# Patient Record
Sex: FEMALE | Race: WHITE | NOT HISPANIC OR LATINO | URBAN - METROPOLITAN AREA
[De-identification: names, ages, dates, MRNs, and addresses within clinical notes are randomized per-mention and may not be internally consistent; named-entity substitution may affect disease eponyms.]

---

## 2017-05-03 ENCOUNTER — OUTPATIENT (OUTPATIENT)
Dept: OUTPATIENT SERVICES | Facility: HOSPITAL | Age: 82
LOS: 1 days | Discharge: HOME | End: 2017-05-03

## 2017-06-28 DIAGNOSIS — R79.89 OTHER SPECIFIED ABNORMAL FINDINGS OF BLOOD CHEMISTRY: ICD-10-CM

## 2017-06-28 DIAGNOSIS — D50.9 IRON DEFICIENCY ANEMIA, UNSPECIFIED: ICD-10-CM

## 2017-07-10 ENCOUNTER — OUTPATIENT (OUTPATIENT)
Dept: OUTPATIENT SERVICES | Facility: HOSPITAL | Age: 82
LOS: 1 days | Discharge: HOME | End: 2017-07-10

## 2017-07-10 DIAGNOSIS — E53.8 DEFICIENCY OF OTHER SPECIFIED B GROUP VITAMINS: ICD-10-CM

## 2017-07-10 DIAGNOSIS — R79.89 OTHER SPECIFIED ABNORMAL FINDINGS OF BLOOD CHEMISTRY: ICD-10-CM

## 2017-07-10 DIAGNOSIS — E55.9 VITAMIN D DEFICIENCY, UNSPECIFIED: ICD-10-CM

## 2017-07-10 DIAGNOSIS — E78.00 PURE HYPERCHOLESTEROLEMIA, UNSPECIFIED: ICD-10-CM

## 2017-07-10 DIAGNOSIS — E03.9 HYPOTHYROIDISM, UNSPECIFIED: ICD-10-CM

## 2017-08-11 ENCOUNTER — OUTPATIENT (OUTPATIENT)
Dept: OUTPATIENT SERVICES | Facility: HOSPITAL | Age: 82
LOS: 1 days | Discharge: HOME | End: 2017-08-11

## 2017-08-11 DIAGNOSIS — N39.0 URINARY TRACT INFECTION, SITE NOT SPECIFIED: ICD-10-CM

## 2017-08-11 DIAGNOSIS — D50.9 IRON DEFICIENCY ANEMIA, UNSPECIFIED: ICD-10-CM

## 2017-08-11 DIAGNOSIS — R79.89 OTHER SPECIFIED ABNORMAL FINDINGS OF BLOOD CHEMISTRY: ICD-10-CM

## 2017-08-11 DIAGNOSIS — E53.8 DEFICIENCY OF OTHER SPECIFIED B GROUP VITAMINS: ICD-10-CM

## 2017-11-29 ENCOUNTER — OUTPATIENT (OUTPATIENT)
Dept: OUTPATIENT SERVICES | Facility: HOSPITAL | Age: 82
LOS: 1 days | Discharge: HOME | End: 2017-11-29

## 2017-11-29 DIAGNOSIS — R79.89 OTHER SPECIFIED ABNORMAL FINDINGS OF BLOOD CHEMISTRY: ICD-10-CM

## 2017-11-29 DIAGNOSIS — N39.0 URINARY TRACT INFECTION, SITE NOT SPECIFIED: ICD-10-CM

## 2018-04-20 ENCOUNTER — OUTPATIENT (OUTPATIENT)
Dept: OUTPATIENT SERVICES | Facility: HOSPITAL | Age: 83
LOS: 1 days | Discharge: HOME | End: 2018-04-20

## 2018-04-20 DIAGNOSIS — R79.89 OTHER SPECIFIED ABNORMAL FINDINGS OF BLOOD CHEMISTRY: ICD-10-CM

## 2018-04-20 DIAGNOSIS — R70.0 ELEVATED ERYTHROCYTE SEDIMENTATION RATE: ICD-10-CM

## 2018-06-08 ENCOUNTER — OUTPATIENT (OUTPATIENT)
Dept: OUTPATIENT SERVICES | Facility: HOSPITAL | Age: 83
LOS: 1 days | Discharge: HOME | End: 2018-06-08

## 2018-06-08 DIAGNOSIS — Z02.9 ENCOUNTER FOR ADMINISTRATIVE EXAMINATIONS, UNSPECIFIED: ICD-10-CM

## 2018-06-14 ENCOUNTER — OUTPATIENT (OUTPATIENT)
Dept: OUTPATIENT SERVICES | Facility: HOSPITAL | Age: 83
LOS: 1 days | Discharge: HOME | End: 2018-06-14

## 2018-06-14 DIAGNOSIS — D64.9 ANEMIA, UNSPECIFIED: ICD-10-CM

## 2018-06-14 DIAGNOSIS — R79.89 OTHER SPECIFIED ABNORMAL FINDINGS OF BLOOD CHEMISTRY: ICD-10-CM

## 2018-06-15 ENCOUNTER — OUTPATIENT (OUTPATIENT)
Dept: OUTPATIENT SERVICES | Facility: HOSPITAL | Age: 83
LOS: 1 days | Discharge: HOME | End: 2018-06-15

## 2018-06-15 DIAGNOSIS — R79.89 OTHER SPECIFIED ABNORMAL FINDINGS OF BLOOD CHEMISTRY: ICD-10-CM

## 2018-06-15 DIAGNOSIS — D64.9 ANEMIA, UNSPECIFIED: ICD-10-CM

## 2018-07-06 ENCOUNTER — OUTPATIENT (OUTPATIENT)
Dept: OUTPATIENT SERVICES | Facility: HOSPITAL | Age: 83
LOS: 1 days | Discharge: HOME | End: 2018-07-06

## 2018-07-06 DIAGNOSIS — Z02.9 ENCOUNTER FOR ADMINISTRATIVE EXAMINATIONS, UNSPECIFIED: ICD-10-CM

## 2018-08-13 ENCOUNTER — OUTPATIENT (OUTPATIENT)
Dept: OUTPATIENT SERVICES | Facility: HOSPITAL | Age: 83
LOS: 1 days | Discharge: HOME | End: 2018-08-13

## 2018-08-13 DIAGNOSIS — R79.89 OTHER SPECIFIED ABNORMAL FINDINGS OF BLOOD CHEMISTRY: ICD-10-CM

## 2018-08-13 DIAGNOSIS — D64.9 ANEMIA, UNSPECIFIED: ICD-10-CM

## 2019-02-02 ENCOUNTER — APPOINTMENT (OUTPATIENT)
Dept: GERIATRICS | Facility: HOME HEALTH | Age: 84
End: 2019-02-02

## 2019-02-04 PROBLEM — Z00.00 ENCOUNTER FOR PREVENTIVE HEALTH EXAMINATION: Status: ACTIVE | Noted: 2019-02-04

## 2019-02-10 NOTE — HISTORY OF PRESENT ILLNESS
[FreeTextEntry1] : pt  dtr called for pt having swelling of R knee nand R anlkle for last few days. no obvious h/o trauma. XR neg for any  pathology

## 2019-02-10 NOTE — PHYSICAL EXAM
[General Appearance - In No Acute Distress] : in no acute distress [Sclera] : the sclera and conjunctiva were normal [PERRL With Normal Accommodation] : pupils were equal in size, round, and reactive to light [Extraocular Movements] : extraocular movements were intact [Auscultation Breath Sounds / Voice Sounds] : lungs were clear to auscultation bilaterally [Heart Rate And Rhythm] : heart rate was normal and rhythm regular [Heart Sounds] : normal S1 and S2 [Heart Sounds Gallop] : no gallops [Murmurs] : no murmurs [Heart Sounds Pericardial Friction Rub] : no pericardial rub [Full Pulse] : the pedal pulses are present [Edema] : there was no peripheral edema [Bowel Sounds] : normal bowel sounds [Abdomen Soft] : soft [Abdomen Tenderness] : non-tender [Abdomen Mass (___ Cm)] : no abdominal mass palpated [Ataxic] : ataxic [FreeTextEntry1] : R ankle swelling, no erythema, no tenderness, LOM of LE >UE [Skin Color & Pigmentation] : normal skin color and pigmentation [Skin Turgor] : normal skin turgor [] : no rash

## 2019-02-10 NOTE — REVIEW OF SYSTEMS
[Arthralgias] : arthralgias [Joint Swelling] : joint swelling [Negative] : Integumentary [FreeTextEntry2] : s/p dementia [FreeTextEntry9] : R ankle and R Knee [de-identified] : pt stable on dementia

## 2019-02-19 ENCOUNTER — INPATIENT (INPATIENT)
Facility: HOSPITAL | Age: 84
LOS: 6 days | Discharge: HOME HEALTH | End: 2019-02-26
Attending: INTERNAL MEDICINE | Admitting: INTERNAL MEDICINE
Payer: MEDICARE

## 2019-02-19 VITALS
SYSTOLIC BLOOD PRESSURE: 128 MMHG | TEMPERATURE: 97 F | DIASTOLIC BLOOD PRESSURE: 66 MMHG | OXYGEN SATURATION: 98 % | HEART RATE: 60 BPM | RESPIRATION RATE: 20 BRPM

## 2019-02-19 DIAGNOSIS — Z90.49 ACQUIRED ABSENCE OF OTHER SPECIFIED PARTS OF DIGESTIVE TRACT: Chronic | ICD-10-CM

## 2019-02-19 DIAGNOSIS — Z96.649 PRESENCE OF UNSPECIFIED ARTIFICIAL HIP JOINT: Chronic | ICD-10-CM

## 2019-02-19 LAB
ALBUMIN SERPL ELPH-MCNC: 3.7 G/DL — SIGNIFICANT CHANGE UP (ref 3.5–5.2)
ALP SERPL-CCNC: 63 U/L — SIGNIFICANT CHANGE UP (ref 30–115)
ALT FLD-CCNC: 17 U/L — SIGNIFICANT CHANGE UP (ref 0–41)
ANION GAP SERPL CALC-SCNC: 11 MMOL/L — SIGNIFICANT CHANGE UP (ref 7–14)
ANION GAP SERPL CALC-SCNC: 17 MMOL/L — HIGH (ref 7–14)
AST SERPL-CCNC: 35 U/L — SIGNIFICANT CHANGE UP (ref 0–41)
BASE EXCESS BLDV CALC-SCNC: 3 MMOL/L — HIGH (ref -2–2)
BASOPHILS # BLD AUTO: 0.04 K/UL — SIGNIFICANT CHANGE UP (ref 0–0.2)
BASOPHILS NFR BLD AUTO: 0.4 % — SIGNIFICANT CHANGE UP (ref 0–1)
BILIRUB SERPL-MCNC: 0.5 MG/DL — SIGNIFICANT CHANGE UP (ref 0.2–1.2)
BUN SERPL-MCNC: 40 MG/DL — HIGH (ref 10–20)
BUN SERPL-MCNC: 41 MG/DL — HIGH (ref 10–20)
CA-I SERPL-SCNC: 1.29 MMOL/L — SIGNIFICANT CHANGE UP (ref 1.12–1.3)
CALCIUM SERPL-MCNC: 9.3 MG/DL — SIGNIFICANT CHANGE UP (ref 8.5–10.1)
CALCIUM SERPL-MCNC: 9.6 MG/DL — SIGNIFICANT CHANGE UP (ref 8.5–10.1)
CHLORIDE SERPL-SCNC: 107 MMOL/L — SIGNIFICANT CHANGE UP (ref 98–110)
CHLORIDE SERPL-SCNC: 108 MMOL/L — SIGNIFICANT CHANGE UP (ref 98–110)
CO2 SERPL-SCNC: 26 MMOL/L — SIGNIFICANT CHANGE UP (ref 17–32)
CO2 SERPL-SCNC: 27 MMOL/L — SIGNIFICANT CHANGE UP (ref 17–32)
CREAT SERPL-MCNC: 1 MG/DL — SIGNIFICANT CHANGE UP (ref 0.7–1.5)
CREAT SERPL-MCNC: 1.1 MG/DL — SIGNIFICANT CHANGE UP (ref 0.7–1.5)
EOSINOPHIL # BLD AUTO: 0.01 K/UL — SIGNIFICANT CHANGE UP (ref 0–0.7)
EOSINOPHIL NFR BLD AUTO: 0.1 % — SIGNIFICANT CHANGE UP (ref 0–8)
GAS PNL BLDV: 152 MMOL/L — HIGH (ref 136–145)
GAS PNL BLDV: SIGNIFICANT CHANGE UP
GLUCOSE SERPL-MCNC: 105 MG/DL — HIGH (ref 70–99)
GLUCOSE SERPL-MCNC: 177 MG/DL — HIGH (ref 70–99)
HCO3 BLDV-SCNC: 31 MMOL/L — HIGH (ref 22–29)
HCT VFR BLD CALC: 37 % — SIGNIFICANT CHANGE UP (ref 37–47)
HCT VFR BLDA CALC: 34.7 % — SIGNIFICANT CHANGE UP (ref 34–44)
HGB BLD CALC-MCNC: 11.3 G/DL — LOW (ref 14–18)
HGB BLD-MCNC: 11.1 G/DL — LOW (ref 12–16)
IMM GRANULOCYTES NFR BLD AUTO: 0.4 % — HIGH (ref 0.1–0.3)
LACTATE BLDV-MCNC: 4.1 MMOL/L — HIGH (ref 0.5–1.6)
LYMPHOCYTES # BLD AUTO: 0.79 K/UL — LOW (ref 1.2–3.4)
LYMPHOCYTES # BLD AUTO: 8.1 % — LOW (ref 20.5–51.1)
MAGNESIUM SERPL-MCNC: 2.4 MG/DL — SIGNIFICANT CHANGE UP (ref 1.8–2.4)
MCHC RBC-ENTMCNC: 25.4 PG — LOW (ref 27–31)
MCHC RBC-ENTMCNC: 30 G/DL — LOW (ref 32–37)
MCV RBC AUTO: 84.7 FL — SIGNIFICANT CHANGE UP (ref 81–99)
MONOCYTES # BLD AUTO: 0.61 K/UL — HIGH (ref 0.1–0.6)
MONOCYTES NFR BLD AUTO: 6.2 % — SIGNIFICANT CHANGE UP (ref 1.7–9.3)
NEUTROPHILS # BLD AUTO: 8.31 K/UL — HIGH (ref 1.4–6.5)
NEUTROPHILS NFR BLD AUTO: 84.8 % — HIGH (ref 42.2–75.2)
NRBC # BLD: 0 /100 WBCS — SIGNIFICANT CHANGE UP (ref 0–0)
NT-PROBNP SERPL-SCNC: 9678 PG/ML — HIGH (ref 0–300)
PCO2 BLDV: 62 MMHG — HIGH (ref 41–51)
PH BLDV: 7.31 — SIGNIFICANT CHANGE UP (ref 7.26–7.43)
PLATELET # BLD AUTO: 196 K/UL — SIGNIFICANT CHANGE UP (ref 130–400)
PO2 BLDV: 22 MMHG — SIGNIFICANT CHANGE UP (ref 20–40)
POTASSIUM BLDV-SCNC: 4.8 MMOL/L — SIGNIFICANT CHANGE UP (ref 3.3–5.6)
POTASSIUM SERPL-MCNC: 5 MMOL/L — SIGNIFICANT CHANGE UP (ref 3.5–5)
POTASSIUM SERPL-MCNC: 5.2 MMOL/L — HIGH (ref 3.5–5)
POTASSIUM SERPL-SCNC: 5 MMOL/L — SIGNIFICANT CHANGE UP (ref 3.5–5)
POTASSIUM SERPL-SCNC: 5.2 MMOL/L — HIGH (ref 3.5–5)
PROT SERPL-MCNC: 6.9 G/DL — SIGNIFICANT CHANGE UP (ref 6–8)
RBC # BLD: 4.37 M/UL — SIGNIFICANT CHANGE UP (ref 4.2–5.4)
RBC # FLD: 21.3 % — HIGH (ref 11.5–14.5)
SAO2 % BLDV: 24 % — SIGNIFICANT CHANGE UP
SODIUM SERPL-SCNC: 145 MMOL/L — SIGNIFICANT CHANGE UP (ref 135–146)
SODIUM SERPL-SCNC: 151 MMOL/L — HIGH (ref 135–146)
TROPONIN T SERPL-MCNC: 0.07 NG/ML — CRITICAL HIGH
TROPONIN T SERPL-MCNC: 0.08 NG/ML — CRITICAL HIGH
WBC # BLD: 9.8 K/UL — SIGNIFICANT CHANGE UP (ref 4.8–10.8)
WBC # FLD AUTO: 9.8 K/UL — SIGNIFICANT CHANGE UP (ref 4.8–10.8)

## 2019-02-19 RX ORDER — MEMANTINE HYDROCHLORIDE 10 MG/1
10 TABLET ORAL
Qty: 0 | Refills: 0 | Status: DISCONTINUED | OUTPATIENT
Start: 2019-02-19 | End: 2019-02-26

## 2019-02-19 RX ORDER — CHLORHEXIDINE GLUCONATE 213 G/1000ML
1 SOLUTION TOPICAL
Qty: 0 | Refills: 0 | Status: DISCONTINUED | OUTPATIENT
Start: 2019-02-19 | End: 2019-02-26

## 2019-02-19 RX ORDER — SODIUM CHLORIDE 9 MG/ML
1000 INJECTION, SOLUTION INTRAVENOUS ONCE
Qty: 0 | Refills: 0 | Status: DISCONTINUED | OUTPATIENT
Start: 2019-02-19 | End: 2019-02-19

## 2019-02-19 RX ORDER — RIVASTIGMINE 4.6 MG/24H
1 PATCH, EXTENDED RELEASE TRANSDERMAL
Qty: 0 | Refills: 0 | COMMUNITY

## 2019-02-19 RX ORDER — MEMANTINE HYDROCHLORIDE 10 MG/1
1 TABLET ORAL
Qty: 0 | Refills: 0 | COMMUNITY

## 2019-02-19 RX ORDER — ASPIRIN/CALCIUM CARB/MAGNESIUM 324 MG
81 TABLET ORAL DAILY
Qty: 0 | Refills: 0 | Status: DISCONTINUED | OUTPATIENT
Start: 2019-02-19 | End: 2019-02-23

## 2019-02-19 RX ORDER — SODIUM CHLORIDE 9 MG/ML
2000 INJECTION, SOLUTION INTRAVENOUS ONCE
Qty: 0 | Refills: 0 | Status: COMPLETED | OUTPATIENT
Start: 2019-02-19 | End: 2019-02-19

## 2019-02-19 RX ORDER — ENOXAPARIN SODIUM 100 MG/ML
40 INJECTION SUBCUTANEOUS DAILY
Qty: 0 | Refills: 0 | Status: DISCONTINUED | OUTPATIENT
Start: 2019-02-19 | End: 2019-02-21

## 2019-02-19 RX ADMIN — SODIUM CHLORIDE 1000 MILLILITER(S): 9 INJECTION, SOLUTION INTRAVENOUS at 13:30

## 2019-02-19 NOTE — ED ADULT NURSE NOTE - NSIMPLEMENTINTERV_GEN_ALL_ED
Implemented All Fall Risk Interventions:  [place] to call system. Call bell, personal items and telephone within reach. Instruct patient to call for assistance. Room bathroom lighting operational. Non-slip footwear when patient is off stretcher. Physically safe environment: no spills, clutter or unnecessary equipment. Stretcher in lowest position, wheels locked, appropriate side rails in place. Provide visual cue, wrist band, yellow gown, etc. Monitor gait and stability. Monitor for mental status changes and reorient to person, place, and time. Review medications for side effects contributing to fall risk. Reinforce activity limits and safety measures with patient and family.

## 2019-02-19 NOTE — ED PROVIDER NOTE - CLINICAL SUMMARY MEDICAL DECISION MAKING FREE TEXT BOX
Patient presented s/p witnessed episode of unresponsiveness. No seizure activity, no incontinence, no post-ictal period to suggest seizure. No trauma since patient was guided down to seated position. Asymptomatic on arrival to ED, no neuro deficits to suggest CVA, although TIA possibility. Labs remarkable for elevated lactate, but no WBC count, patient appears dry on exam and family states patient has not been eating well over the past few days. This coupled with mild hypernatremia makes this more likely. Will repeat VBG. CT head unremarkable, although TIA remains a possibility. Patient with non-ischemic changes on EKG with elevated troponin and pro-bnp, which is more likely etiology. Otherwise HD stable, no cardiac events during ED monitoring. Will admit for further work up and monitoring. Patient and family agreeable with plan.

## 2019-02-19 NOTE — H&P ADULT - ATTENDING COMMENTS
89F with PMH of dementia and HTN was brought to ED for syncopal episode. As per daughter patient was feeling weak for the last few days, yesterday patient was at home walking with her walker when she felt weak, she helped her to sit down on the chair and suddenly patient became unresponsive and looks like her breath stopped, she called the ambulance immediately and advised to put he patient on the bed, then patient became awake. When EMS came she was hypotensive to SBP 80s, unknown HR, however her BP improved with no intervention and she returned to baseline mental status.  In ED, afebrile, VSS, Na 151, lactate 4, trop 0.08, BNP 9K. EKG showed 1st degree AV block (OH of 250), CT H showed non specific periventricular hypodensities,     PHYSICAL EXAM:  GENERAL: NAD, well-developed  HEAD:  Atraumatic, Normocephalic  EYES: EOMI, PERRLA, conjunctiva and sclera clear  NECK: Supple, No JVD  CHEST/LUNG: Clear to auscultation bilaterally; No wheeze  HEART: Regular rate and rhythm; S1 S2  ABDOMEN: Soft, Nontender, Nondistended; Bowel sounds present  EXTREMITIES:  2+ Peripheral Pulses, No clubbing, cyanosis, or edema  PSYCH: Alert, demented.   NEUROLOGY: non-focal    A/P:   Syncopal Episode:   Likely from hypotension, can't rule out arrhythmia or CVA.   Check orthostatic.   Continue Telemetry monitor.   Head CT   Pending EEG.     Hypotension:   Improved.   possible from dehydration.     Hypernatremia: Mild.   likely from poor oral intake.   Start on D5 50cc per hour   BMP monitor every 12 hrs.    Elevated troponin and Pro-BNP:   Check Echo, telemetry monitor.     Dementia:   stable.

## 2019-02-19 NOTE — H&P ADULT - HISTORY OF PRESENT ILLNESS
89F with PMHx of dementia and HTN presents for "freezing"/brief change in mental status. Patient is a poor historian, history per daughter. Since yesterday patient's gait has been altered, appearing "knock kneed" and slower, and appeared slightly weaker than normal, however patient had no other specific complaints or changes. Today patient was walking with walker, when she suddenly froze with eyes open and staring straight ahead. She was grabbed by her daughter and aide and placed in a chair, and was in this state for approximately 5-10 minutes. Additionally, she appeared to only be breathing out of the side of her mouth. When EMS came she was hypotensive to SBP 80s, unknown HR, however her BP improved with no intervention and she returned to baseline mental status.  In ED, afebrile, VSS, Na 151, lactate 4, trop 0.08, BNP ~78013, CXR no effusions or congestion on my read, given 2L LR. EKG showed 1st degree AV block (LA of 250), CT H showed non specific nika-ventrical hypodensities, DDx include gliosis, demyelination or ischemic changes. UA not yet sent.

## 2019-02-19 NOTE — ED PROVIDER NOTE - PHYSICAL EXAMINATION
Vital Signs: I have reviewed the initial vital signs.  Constitutional: NAD, well-nourished, appears stated age, no acute distress.  HEENT: Airway patent, moist MM, no erythema/swelling/deformity of oral structures. EOMI, PERRLA.  CV: regular rate, regular rhythm, well-perfused extremities, 2+ b/l DP and radial pulses equal.  Lungs: BCTA, no increased WOB.  ABD: NTND, no guarding or rebound, no pulsatile mass, no hernias.   MSK: Neck supple, nontender, nl ROM, no stepoff. Chest nontender. Back nontender in TLS spine or to b/l bony structures or flanks. Ext nontender, nl rom, no deformity.   INTEG: Skin warm, dry, no rash.  NEURO: A&Ox3, normal strength, nl sensation throughout, normal speech.   PSYCH: Calm, cooperative, normal affect and interaction.

## 2019-02-19 NOTE — ED PROVIDER NOTE - CARE PLAN
Principal Discharge DX:	Syncope, unspecified syncope type  Secondary Diagnosis:	Elevated troponin  Secondary Diagnosis:	Elevated lactic acid level  Secondary Diagnosis:	Abnormal EKG

## 2019-02-19 NOTE — ED ADULT TRIAGE NOTE - CHIEF COMPLAINT QUOTE
c/o weakness and syncopal episode while getting a bath by the home health aide. Hx dementia. Normal baseline.

## 2019-02-19 NOTE — H&P ADULT - NSHPLABSRESULTS_GEN_ALL_CORE
< from: CT Head No Cont (02.19.19 @ 15:49) >      1.  No acute intracranial hemorrhage.    2.  Chronic right temporal lobe infarct.    3.  Periventricular white matter hypodensities, nonspecific, differential   diagnostic possibilities include ischemic change, gliosis or   demyelination.

## 2019-02-19 NOTE — ED PROVIDER NOTE - PROGRESS NOTE DETAILS
Patient seen and evaluated by me. Labs/imaging ordered. Patient neuro intact, no evidence of trauma, and entire episode was witnessed without trauma. Will do syncopal work up + CT head for possible seizure, although less likely.

## 2019-02-19 NOTE — H&P ADULT - NSHPPHYSICALEXAM_GEN_ALL_CORE
General: NAD  HEENT: NCAT, EOMI  Pulm: CTAB, no respiratory distress  CVS: Distant heart sounds, regular rate  GI: Soft, NT, ND  : No suprapubic pain  Extremities: No edema  Neuro: AAOx1, no FND

## 2019-02-19 NOTE — H&P ADULT - ASSESSMENT
89F with PMHx of dementia and HTN presents for "freezing"/brief change in mental status.    Change in mental status/LOC: Described as patient freezing and staring, no limb movements, incontinence, lactate 4, lasted 5-10 minutes, returned to baseline, hypotensive when EMS arrived however improved with no intervention (seizure (elevated lactate) vs syncope vs vasovagal vs conduction delay vs symptomatic hypernatremia vs other)  - 2d echo  - REEG  - Consider MRI brain (CTH showed periventricular hypodensities, non specific)  - Repeat EKG, not bradycardic but 1st AV block (possible pause?)  - IVF  - Monitor lactate    Hypernatremia:  - IVF  - Monitor BMP    Dementia:  - Continue with memantine  - Patient uses home exelon patches    Diet: DASH  DVT ppx: Lovenox  Dispo: From home, lives with daughter, has full time aide 89F with PMHx of dementia and HTN presents for "freezing"/brief change in mental status.    Change in mental status/LOC: Described as patient freezing and staring, no limb movements, incontinence, lactate 4, lasted 5-10 minutes, returned to baseline, hypotensive when EMS arrived however improved with no intervention (seizure (elevated lactate) vs syncope vs vasovagal vs conduction delay vs symptomatic hypernatremia vs other)  - 2d echo  - REEG  - Consider MRI brain (CTH showed periventricular hypodensities, non specific)  - Repeat EKG, not bradycardic but 1st AV block (possible pause?)  - IVF  - Monitor lactate    Hypernatremia: Received 2L in ED  - D5  - Monitor BMP    Elevated BNP: Does not appear overloaded; will give fluid for hypernatremia  - F/u CXR  - 2d echo    Dementia:  - Continue with memantine  - Patient uses home exelon patches    Diet: DASH  DVT ppx: Lovenox  Dispo: From home, lives with daughter, has full time aide 89F with PMHx of dementia and HTN presents for "freezing"/brief change in mental status.    Change in mental status/LOC: Described as patient freezing and staring, no limb movements, incontinence, lactate 4, lasted 5-10 minutes, returned to baseline, hypotensive when EMS arrived however improved with no intervention (seizure (elevated lactate) vs syncope vs vasovagal vs conduction delay vs symptomatic hypernatremia vs other)  - 2d echo  - REEG  - Consider MRI brain (CTH showed periventricular hypodensities, non specific)  - Repeat EKG, not bradycardic but 1st AV block (possible pause?)  - IVF  - Monitor lactate    Hypernatremia: Received 2L in ED  - D5  - Monitor BMP    Elevated BNP: Does not appear overloaded; will give fluid for hypernatremia  - F/u CXR  - 2d echo    troponinemia: First trop 0.08, no CP  - Serial CE, EKG    Dementia:  - Continue with memantine  - Patient uses home exelon patches    Diet: DASH  DVT ppx: Lovenox  Dispo: From home, lives with daughter, has full time aide 89F with PMHx of dementia and HTN presents for "freezing"/brief change in mental status.    Change in mental status/LOC: Described as patient freezing and staring, no limb movements, incontinence, lactate 4, lasted 5-10 minutes, returned to baseline, hypotensive when EMS arrived however improved with no intervention (seizure (elevated lactate) vs syncope vs vasovagal vs conduction delay vs symptomatic hypernatremia vs other)  - 2d echo  - REEG  - Consider MRI brain (CTH showed periventricular hypodensities, non specific)  - Repeat EKG, not bradycardic but 1st AV block (possible pause?)  - IVF  - Monitor lactate    Hypernatremia: Received 2L in ED  - D5  - Monitor BMP    Elevated BNP: Does not appear overloaded; will give fluid for hypernatremia  - F/u CXR  - 2d echo    troponinemia: First trop 0.08, no CP  - Serial CE, EKG  - ASA81    Dementia:  - Continue with memantine  - Patient uses home exelon patches    Diet: DASH  DVT ppx: Lovenox  Dispo: From home, lives with daughter, has full time aide 89F with PMHx of dementia and HTN presents for "freezing"/brief change in mental status.    Change in mental status/LOC: Described as patient freezing and staring, no limb movements, incontinence, lactate 4, lasted 5-10 minutes, returned to baseline, hypotensive when EMS arrived however improved with no intervention (DDx: seizure (elevated lactate) vs syncope vs vasovagal vs conduction delay vs TIA vs other)  - Neuro consult  - 2d echo  - REEG  - Consider MRI brain (CTH showed periventricular hypodensities, non specific)  - Repeat EKG, not bradycardic but 1st AV block (possible pause?)  - Monitor lactate    Hypernatremia: Received 2L in ED  - D5  - Monitor BMP    Elevated BNP: Does not appear overloaded; will give fluid for hypernatremia  - F/u CXR  - 2d echo    troponinemia: First trop 0.08, no CP  - Serial CE, EKG  - ASA81    Dementia:  - Continue with memantine  - Patient uses home exelon patches    Diet: DASH  DVT ppx: Lovenox  Dispo: From home, lives with daughter, has full time aide

## 2019-02-20 LAB
ANION GAP SERPL CALC-SCNC: 11 MMOL/L — SIGNIFICANT CHANGE UP (ref 7–14)
ANION GAP SERPL CALC-SCNC: 13 MMOL/L — SIGNIFICANT CHANGE UP (ref 7–14)
ANION GAP SERPL CALC-SCNC: 14 MMOL/L — SIGNIFICANT CHANGE UP (ref 7–14)
APPEARANCE UR: CLEAR — SIGNIFICANT CHANGE UP
BASOPHILS # BLD AUTO: 0.05 K/UL — SIGNIFICANT CHANGE UP (ref 0–0.2)
BASOPHILS NFR BLD AUTO: 0.7 % — SIGNIFICANT CHANGE UP (ref 0–1)
BILIRUB UR-MCNC: NEGATIVE — SIGNIFICANT CHANGE UP
BUN SERPL-MCNC: 34 MG/DL — HIGH (ref 10–20)
BUN SERPL-MCNC: 39 MG/DL — HIGH (ref 10–20)
BUN SERPL-MCNC: 41 MG/DL — HIGH (ref 10–20)
CALCIUM SERPL-MCNC: 9 MG/DL — SIGNIFICANT CHANGE UP (ref 8.5–10.1)
CALCIUM SERPL-MCNC: 9.1 MG/DL — SIGNIFICANT CHANGE UP (ref 8.5–10.1)
CALCIUM SERPL-MCNC: 9.2 MG/DL — SIGNIFICANT CHANGE UP (ref 8.5–10.1)
CHLORIDE SERPL-SCNC: 109 MMOL/L — SIGNIFICANT CHANGE UP (ref 98–110)
CHLORIDE SERPL-SCNC: 109 MMOL/L — SIGNIFICANT CHANGE UP (ref 98–110)
CHLORIDE SERPL-SCNC: 110 MMOL/L — SIGNIFICANT CHANGE UP (ref 98–110)
CK MB CFR SERPL CALC: 3.5 NG/ML — SIGNIFICANT CHANGE UP (ref 0.6–6.3)
CO2 SERPL-SCNC: 27 MMOL/L — SIGNIFICANT CHANGE UP (ref 17–32)
CO2 SERPL-SCNC: 28 MMOL/L — SIGNIFICANT CHANGE UP (ref 17–32)
CO2 SERPL-SCNC: 29 MMOL/L — SIGNIFICANT CHANGE UP (ref 17–32)
COD CRY URNS QL: ABNORMAL /HPF
COLOR SPEC: SIGNIFICANT CHANGE UP
CREAT SERPL-MCNC: 0.8 MG/DL — SIGNIFICANT CHANGE UP (ref 0.7–1.5)
CREAT SERPL-MCNC: 0.8 MG/DL — SIGNIFICANT CHANGE UP (ref 0.7–1.5)
CREAT SERPL-MCNC: 0.9 MG/DL — SIGNIFICANT CHANGE UP (ref 0.7–1.5)
D DIMER BLD IA.RAPID-MCNC: 5059 NG/ML DDU — HIGH (ref 0–230)
DIFF PNL FLD: NEGATIVE — SIGNIFICANT CHANGE UP
EOSINOPHIL # BLD AUTO: 0.07 K/UL — SIGNIFICANT CHANGE UP (ref 0–0.7)
EOSINOPHIL NFR BLD AUTO: 0.9 % — SIGNIFICANT CHANGE UP (ref 0–8)
GLUCOSE SERPL-MCNC: 106 MG/DL — HIGH (ref 70–99)
GLUCOSE SERPL-MCNC: 109 MG/DL — HIGH (ref 70–99)
GLUCOSE SERPL-MCNC: 113 MG/DL — HIGH (ref 70–99)
GLUCOSE UR QL: NEGATIVE — SIGNIFICANT CHANGE UP
HCT VFR BLD CALC: 34.1 % — LOW (ref 37–47)
HGB BLD-MCNC: 10.4 G/DL — LOW (ref 12–16)
IMM GRANULOCYTES NFR BLD AUTO: 0.4 % — HIGH (ref 0.1–0.3)
KETONES UR-MCNC: NEGATIVE — SIGNIFICANT CHANGE UP
LACTATE SERPL-SCNC: 1.4 MMOL/L — SIGNIFICANT CHANGE UP (ref 0.5–2.2)
LEUKOCYTE ESTERASE UR-ACNC: NEGATIVE — SIGNIFICANT CHANGE UP
LYMPHOCYTES # BLD AUTO: 1.25 K/UL — SIGNIFICANT CHANGE UP (ref 1.2–3.4)
LYMPHOCYTES # BLD AUTO: 16.4 % — LOW (ref 20.5–51.1)
MAGNESIUM SERPL-MCNC: 2.1 MG/DL — SIGNIFICANT CHANGE UP (ref 1.8–2.4)
MCHC RBC-ENTMCNC: 25.5 PG — LOW (ref 27–31)
MCHC RBC-ENTMCNC: 30.5 G/DL — LOW (ref 32–37)
MCV RBC AUTO: 83.6 FL — SIGNIFICANT CHANGE UP (ref 81–99)
MONOCYTES # BLD AUTO: 0.76 K/UL — HIGH (ref 0.1–0.6)
MONOCYTES NFR BLD AUTO: 9.9 % — HIGH (ref 1.7–9.3)
NEUTROPHILS # BLD AUTO: 5.48 K/UL — SIGNIFICANT CHANGE UP (ref 1.4–6.5)
NEUTROPHILS NFR BLD AUTO: 71.7 % — SIGNIFICANT CHANGE UP (ref 42.2–75.2)
NITRITE UR-MCNC: NEGATIVE — SIGNIFICANT CHANGE UP
NRBC # BLD: 0 /100 WBCS — SIGNIFICANT CHANGE UP (ref 0–0)
PH UR: 5.5 — SIGNIFICANT CHANGE UP (ref 5–8)
PLATELET # BLD AUTO: 165 K/UL — SIGNIFICANT CHANGE UP (ref 130–400)
POTASSIUM SERPL-MCNC: 4.1 MMOL/L — SIGNIFICANT CHANGE UP (ref 3.5–5)
POTASSIUM SERPL-MCNC: 4.4 MMOL/L — SIGNIFICANT CHANGE UP (ref 3.5–5)
POTASSIUM SERPL-MCNC: 4.5 MMOL/L — SIGNIFICANT CHANGE UP (ref 3.5–5)
POTASSIUM SERPL-SCNC: 4.1 MMOL/L — SIGNIFICANT CHANGE UP (ref 3.5–5)
POTASSIUM SERPL-SCNC: 4.4 MMOL/L — SIGNIFICANT CHANGE UP (ref 3.5–5)
POTASSIUM SERPL-SCNC: 4.5 MMOL/L — SIGNIFICANT CHANGE UP (ref 3.5–5)
PROT UR-MCNC: 30
RBC # BLD: 4.08 M/UL — LOW (ref 4.2–5.4)
RBC # FLD: 21.2 % — HIGH (ref 11.5–14.5)
SODIUM SERPL-SCNC: 149 MMOL/L — HIGH (ref 135–146)
SODIUM SERPL-SCNC: 150 MMOL/L — HIGH (ref 135–146)
SODIUM SERPL-SCNC: 151 MMOL/L — HIGH (ref 135–146)
SP GR SPEC: >=1.03 — SIGNIFICANT CHANGE UP (ref 1.01–1.03)
TROPONIN T SERPL-MCNC: 0.07 NG/ML — CRITICAL HIGH
TROPONIN T SERPL-MCNC: 0.08 NG/ML — CRITICAL HIGH
UROBILINOGEN FLD QL: 0.2 — SIGNIFICANT CHANGE UP (ref 0.2–0.2)
WBC # BLD: 7.64 K/UL — SIGNIFICANT CHANGE UP (ref 4.8–10.8)
WBC # FLD AUTO: 7.64 K/UL — SIGNIFICANT CHANGE UP (ref 4.8–10.8)

## 2019-02-20 RX ORDER — SODIUM CHLORIDE 9 MG/ML
1000 INJECTION, SOLUTION INTRAVENOUS
Qty: 0 | Refills: 0 | Status: DISCONTINUED | OUTPATIENT
Start: 2019-02-20 | End: 2019-02-21

## 2019-02-20 RX ADMIN — ENOXAPARIN SODIUM 40 MILLIGRAM(S): 100 INJECTION SUBCUTANEOUS at 12:00

## 2019-02-20 RX ADMIN — MEMANTINE HYDROCHLORIDE 10 MILLIGRAM(S): 10 TABLET ORAL at 06:15

## 2019-02-20 RX ADMIN — MEMANTINE HYDROCHLORIDE 10 MILLIGRAM(S): 10 TABLET ORAL at 17:57

## 2019-02-20 RX ADMIN — SODIUM CHLORIDE 50 MILLILITER(S): 9 INJECTION, SOLUTION INTRAVENOUS at 15:17

## 2019-02-20 RX ADMIN — Medication 81 MILLIGRAM(S): at 12:00

## 2019-02-20 RX ADMIN — SODIUM CHLORIDE 50 MILLILITER(S): 9 INJECTION, SOLUTION INTRAVENOUS at 20:39

## 2019-02-20 NOTE — PROGRESS NOTE ADULT - SUBJECTIVE AND OBJECTIVE BOX
SUBJECTIVE:    Patient is a 89y old Female who presents with a chief complaint of AMS (19 Feb 2019 21:01)    Currently admitted to medicine with the primary diagnosis of Syncope, unspecified syncope type     Today is hospital day 1d. This morning she is resting comfortably in bed and reports no new issues or overnight events.     PAST MEDICAL & SURGICAL HISTORY  Dementia  History of hip replacement  History of cholecystectomy    SOCIAL HISTORY:  Negative for smoking/alcohol/drug use.     Home Medications:  Home Medications:  Exelon 9.5 mg/24 hr transdermal film, extended release: 1 patch transdermal once a day (19 Feb 2019 21:10)  memantine 10 mg oral tablet: 1 tab(s) orally 2 times a day (19 Feb 2019 21:10)      ALLERGIES:  No Known Allergies    MEDICATIONS:  STANDING MEDICATIONS  aspirin  chewable 81 milliGRAM(s) Oral daily  chlorhexidine 4% Liquid 1 Application(s) Topical <User Schedule>  dextrose 5%. 1000 milliLiter(s) IV Continuous <Continuous>  enoxaparin Injectable 40 milliGRAM(s) SubCutaneous daily  memantine 10 milliGRAM(s) Oral two times a day    PRN MEDICATIONS    VITALS:   Vital Signs Last 24 Hrs  T(C): --  T(F): --  HR: 88 (20 Feb 2019 10:38) (82 - 88)  BP: 130/69 (20 Feb 2019 10:38) (128/62 - 163/86)  BP(mean): --  RR: 18 (20 Feb 2019 06:18) (18 - 18)  SpO2: 93% (20 Feb 2019 10:38) (90% - 94%)  CAPILLARY BLOOD GLUCOSE          LABS:                        10.4   7.64  )-----------( 165      ( 20 Feb 2019 08:21 )             34.1     02-20    151<H>  |  109  |  39<H>  ----------------------------<  106<H>  4.4   |  29  |  0.8    Ca    9.0      20 Feb 2019 08:21  Mg     2.4     02-19    TPro  6.9  /  Alb  3.7  /  TBili  0.5  /  DBili  x   /  AST  35  /  ALT  17  /  AlkPhos  63  02-19      Urinalysis Basic - ( 20 Feb 2019 00:30 )    Color: Dark Yellow / Appearance: Clear / SG: >=1.030 / pH: x  Gluc: x / Ketone: Negative  / Bili: Negative / Urobili: 0.2   Blood: x / Protein: 30 / Nitrite: Negative   Leuk Esterase: Negative / RBC: x / WBC x   Sq Epi: x / Non Sq Epi: x / Bacteria: x        Troponin T, Serum: 0.08 ng/mL <HH> (02-20-19 @ 08:21)  Lactate, Blood: 1.4 mmol/L (02-20-19 @ 00:41)  Troponin T, Serum: 0.07 ng/mL <HH> (02-19-19 @ 21:54)      CARDIAC MARKERS ( 20 Feb 2019 08:21 )  x     / 0.08 ng/mL / x     / x     / 3.5 ng/mL  CARDIAC MARKERS ( 19 Feb 2019 21:54 )  x     / 0.07 ng/mL / x     / x     / x      CARDIAC MARKERS ( 19 Feb 2019 12:08 )  x     / 0.08 ng/mL / x     / x     / x          RADIOLOGY:    PHYSICAL EXAM:  GEN: No acute distress  LUNGS: Clear to auscultation bilaterally   HEART: S1/S2 present. RRR.   ABD: Soft, non-tender, non-distended. Bowel sounds present  EXT: NC/NC/NE/2+PP/BUTLER  NEURO: AAOX1 to self

## 2019-02-20 NOTE — PROGRESS NOTE ADULT - ASSESSMENT
89F with PMHx of dementia and HTN presents for "freezing"/brief change in mental status.    Change in mental status/LOC (DDx: seizure (elevated lactate) vs syncope vs vasovagal vs conduction delay vs TIA vs other)  - Neuro consult  - Echo shows GIDD and severe pulm HTN  - REEG pending  - Orthostatic Vitals  - Cont telemetry Monitoring  - Monitor lactate    Hypernatremia: Received 2L in ED  - D5 @ 50cc/hr x10 hours  - Monitor BMP    troponinemia: First trop 0.08, no CP  - Serial CE, EKG  - ASA81    Dementia:  - Continue with memantine  - Patient uses home exelon patches    Diet: DASH  DVT ppx: Lovenox  Dispo: From home, lives with daughter, has full time aide 89F with PMHx of dementia and HTN presents for "freezing"/brief change in mental status.    Change in mental status/LOC (DDx: seizure (elevated lactate) vs syncope vs vasovagal vs conduction delay vs TIA vs other)  - Neuro consult  - Echo shows GIDD and severe pulm HTN with sever tricuspid regur  - Will order D-Dimer. R/O PE with CTA chest if D-dimer positive  - REEG pending  - Orthostatic Vitals  - Cont telemetry Monitoring  - Monitor lactate    Hypernatremia: Received 2L in ED  - D5 @ 50cc/hr x10 hours  - Monitor BMP    troponinemia: First trop 0.08, no CP  - Serial CE, EKG  - ASA81    Dementia:  - Continue with memantine  - Patient uses home exelon patches    Diet: DASH  DVT ppx: Lovenox  Dispo: From home, lives with daughter, has full time aide

## 2019-02-20 NOTE — CONSULT NOTE ADULT - ASSESSMENT
89 yo female with pmh of HTN, dementia presents with an episode of syncopy/brief LOC with fixed dazed gaze as per daughter lasted for one minute and then back to baseline. CTH is positive for chronic right temporal stroke and gliosis.    Plan:  REEG  Continue current meds, IFV  Telemetry  Measure orthostatics  No AEDs for now        Discussed with neuroattending Dr. Garay

## 2019-02-20 NOTE — CONSULT NOTE ADULT - SUBJECTIVE AND OBJECTIVE BOX
Neurology Consult    Patient is a 89y old  Female who presents with a chief complaint of AMS (20 Feb 2019 14:43)      HPI:  89F with PMHx of dementia and HTN presents for "freezing"/brief change in mental status. Patient is a poor historian, history per daughter. Since yesterday patient's gait has been altered, appearing "knock kneed" and slower, and appeared slightly weaker than normal, however patient had no other specific complaints or changes. Today patient was walking with walker, when she suddenly froze with eyes open and staring straight ahead. She was grabbed by her daughter and aide and placed in a chair, and was in this state for approximately 5-10 minutes. Additionally, she appeared to only be breathing out of the side of her mouth. When EMS came she was hypotensive to SBP 80s, unknown HR, however her BP improved with no intervention and she returned to baseline mental status.  In ED, afebrile, VSS, Na 151, lactate 4, trop 0.08, BNP ~71344, CXR no effusions or congestion on my read, given 2L LR. EKG showed 1st degree AV block (CT of 250), CT H showed non specific nika-ventrical hypodensities, DDx include gliosis, demyelination or ischemic changes. UA not yet sent. (19 Feb 2019 21:01)    Patient is examined at the bedside. She is aaox2, follows commands. Does not answer questions appropriately, has behavioral issues but thats her baseline daughter says. During episode there was no tonic clonic activitiy noted. Patient denies any previous episodes. CTH is positive for chronic right temporal infarct and gliosis.  Currently she denies any dizziness, lightheadedness, blurry vision or headache.  Exam is non focal, with generalized weakness.      PAST MEDICAL & SURGICAL HISTORY:  Dementia  History of hip replacement  History of cholecystectomy      FAMILY HISTORY:  Family history of CHF (congestive heart failure) (Mother)  Family history of diabetes mellitus (Father)      Social History: (-) x 3    Allergies    No Known Allergies    Intolerances        MEDICATIONS  (STANDING):  aspirin  chewable 81 milliGRAM(s) Oral daily  chlorhexidine 4% Liquid 1 Application(s) Topical <User Schedule>  dextrose 5%. 1000 milliLiter(s) (50 mL/Hr) IV Continuous <Continuous>  enoxaparin Injectable 40 milliGRAM(s) SubCutaneous daily  memantine 10 milliGRAM(s) Oral two times a day    MEDICATIONS  (PRN):      Review of systems:    Constitutional: No fever, weight loss or fatigue    Eyes: No eye pain or discharge  ENMT:  No difficulty hearing; No sinus or throat pain  Neck: No pain or stiffness  Respiratory: No cough, wheezing, chills or hemoptysis  Cardiovascular: No chest pain, palpitations, shortness of breath, dyspnea on exertion  Gastrointestinal: No abdominal pain, nausea, vomiting or hematemesis; No diarrhea or constipation.   Genitourinary: No dysuria, frequency, hematuria or incontinence  Neurological: As per HPI  Skin: No rashes or lesions   Endocrine: No heat or cold intolerance; No hair loss  Musculoskeletal: No joint pain or swelling  Psychiatric: No depression, anxiety, mood swings  Heme/Lymph: No easy bruising or bleeding gums    Vital Signs Last 24 Hrs  T(C): 35.6 (20 Feb 2019 16:13), Max: 35.6 (20 Feb 2019 16:13)  T(F): 96 (20 Feb 2019 16:13), Max: 96 (20 Feb 2019 16:13)  HR: 67 (20 Feb 2019 16:13) (67 - 88)  BP: 139/65 (20 Feb 2019 16:13) (128/62 - 163/86)  BP(mean): --  RR: 18 (20 Feb 2019 16:13) (18 - 18)  SpO2: 98% (20 Feb 2019 16:13) (90% - 98%)    Neurologic Examination:  General:  Appearance is consistent with chronologic age.  No abnormal facies.   General: The patient is oriented to person, place, but not date and time, does not answer questions appropriately, has difficulty with repitition  Cranial nerves: intact VA, VFF.  EOMI w/o nystagmus, skew or reported double vision.  PERRL.  No ptosis/weakness of eyelid closure.  Facial sensation is normal with normal bite.  No facial asymmetry.  Hearing grossly intact b/l.  Palate elevates midline.  Tongue midline.  Motor examination:   Normal tone, bulk and range of motion.  No tenderness, twitching, tremors or involuntary movements.  Formal Muscle Strength Testing: (MRC grade R/L) 4/5 UE; 4/5 LE.  No observable drift.  Sensory examination:   Intact to light touch and pinprick, pain, temperature and proprioception and vibration in all extremities.  Cerebellum:   FTN/HKS intact with normal TORITO in all limbs.  No dysmetria or dysdiadokinesia.      Labs:   CBC Full  -  ( 20 Feb 2019 08:21 )  WBC Count : 7.64 K/uL  Hemoglobin : 10.4 g/dL  Hematocrit : 34.1 %  Platelet Count - Automated : 165 K/uL  Mean Cell Volume : 83.6 fL  Mean Cell Hemoglobin : 25.5 pg  Mean Cell Hemoglobin Concentration : 30.5 g/dL  Auto Neutrophil # : 5.48 K/uL  Auto Lymphocyte # : 1.25 K/uL  Auto Monocyte # : 0.76 K/uL  Auto Eosinophil # : 0.07 K/uL  Auto Basophil # : 0.05 K/uL  Auto Neutrophil % : 71.7 %  Auto Lymphocyte % : 16.4 %  Auto Monocyte % : 9.9 %  Auto Eosinophil % : 0.9 %  Auto Basophil % : 0.7 %    02-20    151<H>  |  109  |  39<H>  ----------------------------<  106<H>  4.4   |  29  |  0.8    Ca    9.0      20 Feb 2019 08:21  Mg     2.4     02-19    TPro  6.9  /  Alb  3.7  /  TBili  0.5  /  DBili  x   /  AST  35  /  ALT  17  /  AlkPhos  63  02-19    LIVER FUNCTIONS - ( 19 Feb 2019 12:08 )  Alb: 3.7 g/dL / Pro: 6.9 g/dL / ALK PHOS: 63 U/L / ALT: 17 U/L / AST: 35 U/L / GGT: x             Urinalysis Basic - ( 20 Feb 2019 00:30 )    Color: Dark Yellow / Appearance: Clear / SG: >=1.030 / pH: x  Gluc: x / Ketone: Negative  / Bili: Negative / Urobili: 0.2   Blood: x / Protein: 30 / Nitrite: Negative   Leuk Esterase: Negative / RBC: x / WBC x   Sq Epi: x / Non Sq Epi: x / Bacteria: x          Neuroimaging:  NCHCT:   CT Angiography/Perfusion:  MRI Brain NC:  MRA Head/Neck:  EEG:    Assessment:  This is a 89y Female with h/o     Plan:   - 02-20-19 @ 16:41

## 2019-02-20 NOTE — CONSULT NOTE ADULT - ATTENDING COMMENTS
I examined patient and spoke to her daughter earlier this evening.  After reviewing the CT scan and prior scans I'm not convinced there is a temporal infarct.  Seems more likely to be atrophy and asymmetry of head in the scanner.  Her spell was not an unequivocal seizure and I am reluctant to advocate starting anticonvulsant w/o more to go on.  She does have advancing dementia and I did explain to daughter a subset of patient's can experience seizures due to that, however I would first explore other reasons for brief staring spell such as hypotension, dehydration, cardiac arrhythmia, etc.. .  I will have a relatively high threshold for anticonvulsants at this point.

## 2019-02-21 LAB
ANION GAP SERPL CALC-SCNC: 9 MMOL/L — SIGNIFICANT CHANGE UP (ref 7–14)
APTT BLD: 29.7 SEC — SIGNIFICANT CHANGE UP (ref 27–39.2)
BUN SERPL-MCNC: 29 MG/DL — HIGH (ref 10–20)
CALCIUM SERPL-MCNC: 9.1 MG/DL — SIGNIFICANT CHANGE UP (ref 8.5–10.1)
CHLORIDE SERPL-SCNC: 109 MMOL/L — SIGNIFICANT CHANGE UP (ref 98–110)
CO2 SERPL-SCNC: 31 MMOL/L — SIGNIFICANT CHANGE UP (ref 17–32)
CREAT SERPL-MCNC: 0.7 MG/DL — SIGNIFICANT CHANGE UP (ref 0.7–1.5)
GLUCOSE SERPL-MCNC: 96 MG/DL — SIGNIFICANT CHANGE UP (ref 70–99)
INR BLD: 1.2 RATIO — SIGNIFICANT CHANGE UP (ref 0.65–1.3)
MAGNESIUM SERPL-MCNC: 2 MG/DL — SIGNIFICANT CHANGE UP (ref 1.8–2.4)
POTASSIUM SERPL-MCNC: 4.3 MMOL/L — SIGNIFICANT CHANGE UP (ref 3.5–5)
POTASSIUM SERPL-SCNC: 4.3 MMOL/L — SIGNIFICANT CHANGE UP (ref 3.5–5)
PROTHROM AB SERPL-ACNC: 13.8 SEC — HIGH (ref 9.95–12.87)
SODIUM SERPL-SCNC: 149 MMOL/L — HIGH (ref 135–146)

## 2019-02-21 PROCEDURE — 93970 EXTREMITY STUDY: CPT | Mod: 26

## 2019-02-21 RX ORDER — ENOXAPARIN SODIUM 100 MG/ML
70 INJECTION SUBCUTANEOUS EVERY 12 HOURS
Qty: 0 | Refills: 0 | Status: DISCONTINUED | OUTPATIENT
Start: 2019-02-21 | End: 2019-02-22

## 2019-02-21 RX ORDER — ENOXAPARIN SODIUM 100 MG/ML
40 INJECTION SUBCUTANEOUS DAILY
Qty: 0 | Refills: 0 | Status: DISCONTINUED | OUTPATIENT
Start: 2019-02-21 | End: 2019-02-21

## 2019-02-21 RX ORDER — SODIUM CHLORIDE 9 MG/ML
1000 INJECTION, SOLUTION INTRAVENOUS
Qty: 0 | Refills: 0 | Status: DISCONTINUED | OUTPATIENT
Start: 2019-02-21 | End: 2019-02-25

## 2019-02-21 RX ADMIN — ENOXAPARIN SODIUM 70 MILLIGRAM(S): 100 INJECTION SUBCUTANEOUS at 06:41

## 2019-02-21 RX ADMIN — MEMANTINE HYDROCHLORIDE 10 MILLIGRAM(S): 10 TABLET ORAL at 05:20

## 2019-02-21 RX ADMIN — SODIUM CHLORIDE 50 MILLILITER(S): 9 INJECTION, SOLUTION INTRAVENOUS at 16:11

## 2019-02-21 RX ADMIN — Medication 81 MILLIGRAM(S): at 12:26

## 2019-02-21 RX ADMIN — MEMANTINE HYDROCHLORIDE 10 MILLIGRAM(S): 10 TABLET ORAL at 17:14

## 2019-02-21 RX ADMIN — ENOXAPARIN SODIUM 70 MILLIGRAM(S): 100 INJECTION SUBCUTANEOUS at 17:13

## 2019-02-21 NOTE — CONSULT NOTE ADULT - ASSESSMENT
Impression:  High-Intermediate risk Pulmonary embolism  HO dementia    Recommendations: Impression:  High-Intermediate risk Pulmonary embolism  HO dementia    Recommendations:  LE duplex  AC on LMWH; Can transition to DoAC on discharge ( eliquis)  Wean off O2; Target So2 > 92%  Discussed with daughter at bedside and son (physician) over phone in length; Considering age and functional status decision was made to pursue conservative medical management and not catheter directed thrombolysis.   They understand bleeding risk with AC however as benefit outweighs risk they agreed with DoAC for now as an AC choice.   Outpatient FU Impression:    Submassive PE  HO dementia    Recommendations:    LE duplex  AC on LMWH; Can transition to DoAC on discharge ( eliquis)  Wean off O2; Target So2 > 92%  Discussed with daughter at bedside and son (physician) over phone in length; Considering age and functional status decision was made to pursue conservative medical management and not catheter directed thrombolysis.   They understand bleeding risk with AC however as benefit outweighs risk they agreed with DoAC for now as an AC choice.   OOB to chair  Outpatient FU

## 2019-02-21 NOTE — CONSULT NOTE ADULT - SUBJECTIVE AND OBJECTIVE BOX
Patient is a 89y old  Female who presents with a chief complaint of AMS (20 Feb 2019 16:39)      HPI:  89F with PMHx of dementia and HTN presents for "freezing"/brief change in mental status. Patient is a poor historian, history per daughter. Since yesterday patient's gait has been altered, appearing "knock kneed" and slower, and appeared slightly weaker than normal, however patient had no other specific complaints or changes. Today patient was walking with walker, when she suddenly froze with eyes open and staring straight ahead. She was grabbed by her daughter and aide and placed in a chair, and was in this state for approximately 5-10 minutes. Additionally, she appeared to only be breathing out of the side of her mouth. When EMS came she was hypotensive to SBP 80s, unknown HR, however her BP improved with no intervention and she returned to baseline mental status.  In ED, afebrile, VSS, Na 151, lactate 4, trop 0.08, BNP ~08066, CXR no effusions or congestion on my read, given 2L LR. EKG showed 1st degree AV block (TN of 250), CT H showed non specific nika-ventrical hypodensities, DDx include gliosis, demyelination or ischemic changes. UA not yet sent. (19 Feb 2019 21:01)    PAST MEDICAL & SURGICAL HISTORY:  Dementia  History of hip replacement  History of cholecystectomy      SOCIAL HX:   Smoking  Exsmoker 5PY         FAMILY HISTORY:  Family history of CHF (congestive heart failure) (Mother)  Family history of diabetes mellitus (Father)    Allergies    No Known Allergies    Intolerances    PHYSICAL EXAM  Vital Signs Last 24 Hrs  T(C): 36.8 (20 Feb 2019 23:50), Max: 36.8 (20 Feb 2019 23:50)  T(F): 98.2 (20 Feb 2019 23:50), Max: 98.2 (20 Feb 2019 23:50)  HR: 73 (20 Feb 2019 23:50) (67 - 88)  BP: 112/91 (20 Feb 2019 23:50) (112/91 - 139/65)  RR: 18 (20 Feb 2019 23:50) (18 - 18)  SpO2: 96% (20 Feb 2019 23:50) (93% - 98%)  I&O's Summary      General: Comfortable in bed  HEENT:  On NC  Lymph node: No palpable LN             Lungs: CTA  Cardiovascular: RRR, S1S2  Abdomen: BS+ve; soft non tender  Extremities: No LE edema  Skin:  No evident Rash  Neurological:       LABS:                          10.4   7.64  )-----------( 165      ( 20 Feb 2019 08:21 )             34.1     02-21    149<H>  |  109  |  29<H>  ----------------------------<  96  4.3   |  31  |  0.7    Ca    9.1      21 Feb 2019 07:42  Mg     2.0     02-21    TPro  6.9  /  Alb  3.7  /  TBili  0.5  /  DBili  x   /  AST  35  /  ALT  17  /  AlkPhos  63  02-19  PT/INR - ( 21 Feb 2019 07:42 )   PT: 13.80 sec;   INR: 1.20 ratio    PTT - ( 21 Feb 2019 07:42 )  PTT:29.7 sec                                           Urinalysis Basic - ( 20 Feb 2019 00:30 )  Color: Dark Yellow / Appearance: Clear / SG: >=1.030 / pH: x  Gluc: x / Ketone: Negative  / Bili: Negative / Urobili: 0.2   Blood: x / Protein: 30 / Nitrite: Negative   Leuk Esterase: Negative / RBC: x / WBC x   Sq Epi: x / Non Sq Epi: x / Bacteria: x    CARDIAC MARKERS ( 20 Feb 2019 21:25 )  x     / 0.07 ng/mL / x     / x     / x      CARDIAC MARKERS ( 20 Feb 2019 08:21 )  x     / 0.08 ng/mL / x     / x     / 3.5 ng/mL  CARDIAC MARKERS ( 19 Feb 2019 21:54 )  x     / 0.07 ng/mL / x     / x     / x      CARDIAC MARKERS ( 19 Feb 2019 12:08 )  x     / 0.08 ng/mL / x     / x     / x        Serum Pro-Brain Natriuretic Peptide: 9678 pg/mL (02.19.19 @ 12:08)    LIVER FUNCTIONS - ( 19 Feb 2019 12:08 )  Alb: 3.7 g/dL / Pro: 6.9 g/dL / ALK PHOS: 63 U/L / ALT: 17 U/L / AST: 35 U/L / GGT: x           Lactate (02-20-19 @ 00:41): 1.4  Lactate (02-19-19 @ 12:08): 4.1<H>    MEDICATIONS  (STANDING):  aspirin  chewable 81 milliGRAM(s) Oral daily  chlorhexidine 4% Liquid 1 Application(s) Topical <User Schedule>  dextrose 5%. 1000 milliLiter(s) (50 mL/Hr) IV Continuous <Continuous>  enoxaparin Injectable 70 milliGRAM(s) SubCutaneous every 12 hours  memantine 10 milliGRAM(s) Oral two times a day    MEDICATIONS  (PRN):      Radiology:   < from: CT Angio Chest w/ IV Cont (02.20.19 @ 23:49) >  IMPRESSION:        1.  Acute bilateral main pulmonary artery emboli extending into segmental   and subsegmental branches of all pulmonary lobes, with evidence of right   heart strain.    < end of copied text > Patient is a 89y old  Female who presents with a chief complaint of AMS (20 Feb 2019 16:39)      HPI:  89F with PMHx of dementia and HTN presents for "freezing"/brief change in mental status. Patient is a poor historian, history per daughter. Since yesterday patient's gait has been altered, appearing "knock kneed" and slower, and appeared slightly weaker than normal, however patient had no other specific complaints or changes. Today patient was walking with walker, when she suddenly froze with eyes open and staring straight ahead. She was grabbed by her daughter and aide and placed in a chair, and was in this state for approximately 5-10 minutes. Additionally, she appeared to only be breathing out of the side of her mouth. When EMS came she was hypotensive to SBP 80s, unknown HR, however her BP improved with no intervention and she returned to baseline mental status.  In ED, afebrile, VSS, Na 151, lactate 4, trop 0.08, BNP ~56804, CXR no effusions or congestion on my read, given 2L LR. EKG showed 1st degree AV block (AK of 250), CT H showed non specific nika-ventrical hypodensities, DDx include gliosis, demyelination or ischemic changes. UA not yet sent. (19 Feb 2019 21:01)  As per daughter at bedside patient had a fall in January after which she stayed in bed for around 2 weeks. After that she started walking with a walker but short distances. Patient needs help with all ADLs.     PAST MEDICAL & SURGICAL HISTORY:  Dementia  History of hip replacement  History of cholecystectomy      SOCIAL HX:   Smoking  Exsmoker 5PY         FAMILY HISTORY:  Family history of CHF (congestive heart failure) (Mother)  Family history of diabetes mellitus (Father)    Allergies    No Known Allergies    Intolerances    PHYSICAL EXAM  Vital Signs Last 24 Hrs  T(C): 36.8 (20 Feb 2019 23:50), Max: 36.8 (20 Feb 2019 23:50)  T(F): 98.2 (20 Feb 2019 23:50), Max: 98.2 (20 Feb 2019 23:50)  HR: 73 (20 Feb 2019 23:50) (67 - 88)  BP: 112/91 (20 Feb 2019 23:50) (112/91 - 139/65)  RR: 18 (20 Feb 2019 23:50) (18 - 18)  SpO2: 96% (20 Feb 2019 23:50) (93% - 98%)  I&O's Summary      General: Comfortable in bed  HEENT:  On NC  Lymph node: No palpable LN             Lungs: CTA  Cardiovascular: RRR, S1S2  Abdomen: BS+ve; soft non tender  Extremities: Left ankle edema  Skin:  No evident Rash  Neurological: AAOx1      LABS:                          10.4   7.64  )-----------( 165      ( 20 Feb 2019 08:21 )             34.1     02-21    149<H>  |  109  |  29<H>  ----------------------------<  96  4.3   |  31  |  0.7    Ca    9.1      21 Feb 2019 07:42  Mg     2.0     02-21    TPro  6.9  /  Alb  3.7  /  TBili  0.5  /  DBili  x   /  AST  35  /  ALT  17  /  AlkPhos  63  02-19  PT/INR - ( 21 Feb 2019 07:42 )   PT: 13.80 sec;   INR: 1.20 ratio    PTT - ( 21 Feb 2019 07:42 )  PTT:29.7 sec                                           Urinalysis Basic - ( 20 Feb 2019 00:30 )  Color: Dark Yellow / Appearance: Clear / SG: >=1.030 / pH: x  Gluc: x / Ketone: Negative  / Bili: Negative / Urobili: 0.2   Blood: x / Protein: 30 / Nitrite: Negative   Leuk Esterase: Negative / RBC: x / WBC x   Sq Epi: x / Non Sq Epi: x / Bacteria: x    CARDIAC MARKERS ( 20 Feb 2019 21:25 )  x     / 0.07 ng/mL / x     / x     / x      CARDIAC MARKERS ( 20 Feb 2019 08:21 )  x     / 0.08 ng/mL / x     / x     / 3.5 ng/mL  CARDIAC MARKERS ( 19 Feb 2019 21:54 )  x     / 0.07 ng/mL / x     / x     / x      CARDIAC MARKERS ( 19 Feb 2019 12:08 )  x     / 0.08 ng/mL / x     / x     / x        Serum Pro-Brain Natriuretic Peptide: 9678 pg/mL (02.19.19 @ 12:08)    LIVER FUNCTIONS - ( 19 Feb 2019 12:08 )  Alb: 3.7 g/dL / Pro: 6.9 g/dL / ALK PHOS: 63 U/L / ALT: 17 U/L / AST: 35 U/L / GGT: x           Lactate (02-20-19 @ 00:41): 1.4  Lactate (02-19-19 @ 12:08): 4.1<H>    MEDICATIONS  (STANDING):  aspirin  chewable 81 milliGRAM(s) Oral daily  chlorhexidine 4% Liquid 1 Application(s) Topical <User Schedule>  dextrose 5%. 1000 milliLiter(s) (50 mL/Hr) IV Continuous <Continuous>  enoxaparin Injectable 70 milliGRAM(s) SubCutaneous every 12 hours  memantine 10 milliGRAM(s) Oral two times a day    MEDICATIONS  (PRN):      Radiology:   < from: CT Angio Chest w/ IV Cont (02.20.19 @ 23:49) >  IMPRESSION:        1.  Acute bilateral main pulmonary artery emboli extending into segmental   and subsegmental branches of all pulmonary lobes, with evidence of right   heart strain.    < end of copied text >    < from: Transthoracic Echocardiogram (02.20.19 @ 09:05) >  Summary:   1. LV Ejection Fraction by Aparicio's Method with a biplane EF of 68 %.   2. Moderately increased LV wall thickness.   3. Spectral Doppler shows impaired relaxation pattern of left   ventricular myocardial filling (Grade I diastolic dysfunction).   4. Thickening and calcification of the anterior and posterior mitral   valve leaflets.   5. Moderate-severe tricuspid regurgitation.   6. Mild aortic regurgitation.   7. Pulmonic valve regurgitation.   8. Estimated pulmonary artery systolic pressure is 67.9 mmHg assuming a   right atrial pressure of 15 mmHg, which is consistent with severe   pulmonary hypertension.    < end of copied text > Patient is a 89y old  Female who presents with a chief complaint of AMS (20 Feb 2019 16:39)      HPI:  89F with PMHx of dementia and HTN presents for "freezing"/brief change in mental status. Patient is a poor historian, history per daughter. Since yesterday patient's gait has been altered, appearing "knock kneed" and slower, and appeared slightly weaker than normal, however patient had no other specific complaints or changes. Today patient was walking with walker, when she suddenly froze with eyes open and staring straight ahead. She was grabbed by her daughter and aide and placed in a chair, and was in this state for approximately 5-10 minutes. Additionally, she appeared to only be breathing out of the side of her mouth. When EMS came she was hypotensive to SBP 80s, unknown HR, however her BP improved with no intervention and she returned to baseline mental status.  In ED, afebrile, VSS, Na 151, lactate 4, trop 0.08, BNP ~80273, CXR no effusions or congestion on my read, given 2L LR. EKG showed 1st degree AV block (AZ of 250), CT H showed non specific nika-ventrical hypodensities, DDx include gliosis, demyelination or ischemic changes. UA not yet sent. (19 Feb 2019 21:01)  As per daughter at bedside patient had a fall in January after which she stayed in bed for around 2 weeks. After that she started walking with a walker but short distances. Patient needs help with all ADLs. called for Chest ct findings/ PE/ RV strain    PAST MEDICAL & SURGICAL HISTORY:  Dementia  History of hip replacement  History of cholecystectomy      SOCIAL HX:   Smoking  Exsmoker 5PY         FAMILY HISTORY:  Family history of CHF (congestive heart failure) (Mother)  Family history of diabetes mellitus (Father)    Allergies    No Known Allergies    Intolerances    PHYSICAL EXAM  Vital Signs Last 24 Hrs  T(C): 36.8 (20 Feb 2019 23:50), Max: 36.8 (20 Feb 2019 23:50)  T(F): 98.2 (20 Feb 2019 23:50), Max: 98.2 (20 Feb 2019 23:50)  HR: 73 (20 Feb 2019 23:50) (67 - 88)  BP: 112/91 (20 Feb 2019 23:50) (112/91 - 139/65)  RR: 18 (20 Feb 2019 23:50) (18 - 18)  SpO2: 96% (20 Feb 2019 23:50) (93% - 98%)  I&O's Summary      General: Comfortable in bed  HEENT:  On NC  Lymph node: No palpable LN             Lungs: CTA  Cardiovascular: RRR, S1S2  Abdomen: BS+ve; soft non tender  Extremities: Left ankle edema  Skin:  No evident Rash  Neurological: AAOx1      LABS:                          10.4   7.64  )-----------( 165      ( 20 Feb 2019 08:21 )             34.1     02-21    149<H>  |  109  |  29<H>  ----------------------------<  96  4.3   |  31  |  0.7    Ca    9.1      21 Feb 2019 07:42  Mg     2.0     02-21    TPro  6.9  /  Alb  3.7  /  TBili  0.5  /  DBili  x   /  AST  35  /  ALT  17  /  AlkPhos  63  02-19  PT/INR - ( 21 Feb 2019 07:42 )   PT: 13.80 sec;   INR: 1.20 ratio    PTT - ( 21 Feb 2019 07:42 )  PTT:29.7 sec                                           Urinalysis Basic - ( 20 Feb 2019 00:30 )  Color: Dark Yellow / Appearance: Clear / SG: >=1.030 / pH: x  Gluc: x / Ketone: Negative  / Bili: Negative / Urobili: 0.2   Blood: x / Protein: 30 / Nitrite: Negative   Leuk Esterase: Negative / RBC: x / WBC x   Sq Epi: x / Non Sq Epi: x / Bacteria: x    CARDIAC MARKERS ( 20 Feb 2019 21:25 )  x     / 0.07 ng/mL / x     / x     / x      CARDIAC MARKERS ( 20 Feb 2019 08:21 )  x     / 0.08 ng/mL / x     / x     / 3.5 ng/mL  CARDIAC MARKERS ( 19 Feb 2019 21:54 )  x     / 0.07 ng/mL / x     / x     / x      CARDIAC MARKERS ( 19 Feb 2019 12:08 )  x     / 0.08 ng/mL / x     / x     / x        Serum Pro-Brain Natriuretic Peptide: 9678 pg/mL (02.19.19 @ 12:08)    LIVER FUNCTIONS - ( 19 Feb 2019 12:08 )  Alb: 3.7 g/dL / Pro: 6.9 g/dL / ALK PHOS: 63 U/L / ALT: 17 U/L / AST: 35 U/L / GGT: x           Lactate (02-20-19 @ 00:41): 1.4  Lactate (02-19-19 @ 12:08): 4.1<H>    MEDICATIONS  (STANDING):  aspirin  chewable 81 milliGRAM(s) Oral daily  chlorhexidine 4% Liquid 1 Application(s) Topical <User Schedule>  dextrose 5%. 1000 milliLiter(s) (50 mL/Hr) IV Continuous <Continuous>  enoxaparin Injectable 70 milliGRAM(s) SubCutaneous every 12 hours  memantine 10 milliGRAM(s) Oral two times a day    MEDICATIONS  (PRN):      Radiology:   < from: CT Angio Chest w/ IV Cont (02.20.19 @ 23:49) >  IMPRESSION:        1.  Acute bilateral main pulmonary artery emboli extending into segmental   and subsegmental branches of all pulmonary lobes, with evidence of right   heart strain.    < end of copied text >    < from: Transthoracic Echocardiogram (02.20.19 @ 09:05) >  Summary:   1. LV Ejection Fraction by Aparicio's Method with a biplane EF of 68 %.   2. Moderately increased LV wall thickness.   3. Spectral Doppler shows impaired relaxation pattern of left   ventricular myocardial filling (Grade I diastolic dysfunction).   4. Thickening and calcification of the anterior and posterior mitral   valve leaflets.   5. Moderate-severe tricuspid regurgitation.   6. Mild aortic regurgitation.   7. Pulmonic valve regurgitation.   8. Estimated pulmonary artery systolic pressure is 67.9 mmHg assuming a   right atrial pressure of 15 mmHg, which is consistent with severe   pulmonary hypertension.    < end of copied text >

## 2019-02-21 NOTE — PROGRESS NOTE ADULT - ASSESSMENT
89F with PMH of dementia and HTN was brought to ED for syncopal episode. As per daughter patient was feeling weak for the last few days, yesterday patient was at home walking with her walker when she felt weak, she helped her to sit down on the chair and suddenly patient became unresponsive and looks like her breath stopped, she called the ambulance immediately and advised to put he patient on the bed, then patient became awake. When EMS came she was hypotensive to SBP 80s, unknown HR, however her BP improved with no intervention and she returned to baseline mental status.  In ED, afebrile, VSS, Na 151, lactate 4, trop 0.08, BNP 9K. EKG showed 1st degree AV block (MS of 250), CT H showed non specific periventricular hypodensities,     A/P:     Acute bilateral Pulmonary embolism:   Chest CT angio showed Acute bilateral main pulmonary artery emboli extending into segmental and subsegmental branches of all pulmonary lobes, with evidence of right heart strain.  EKG showed Mac patricia white sign.  D-dimer was very elevated.   Echo showed severe   Hemodynamically stabl.e   Continue Lovenox for now, transition to NOAC before discharge.   Pulmonary consult.     Syncopal Episode:   Likely from acute pulmonary embolism,  can't rule out arrhythmia   Check orthostatic.   Continue Telemetry monitor.   Head CT   EEG  generalized slowing .    Hypotension:   Improved.     Hypernatremia: Mild.   likely from poor oral intake.   Continue D5 50cc per hour   BMP monitor every 12 hrs.    Elevated troponin and Pro-BNP:   likely from Acute PE and right heart strain.      Dementia:   stable.        CODE STATUS: patient is full code, as per her daughter , patient told them in the past she wants everything to be done, her daughter will discuss this statement with her brother to consider DNR/DNI.     DVT PPX: On therapeutic anticoagulation, Lovenox. 89F with PMH of dementia and HTN was brought to ED for syncopal episode. As per daughter patient was feeling weak for the last few days, yesterday patient was at home walking with her walker when she felt weak, she helped her to sit down on the chair and suddenly patient became unresponsive and looks like her breath stopped, she called the ambulance immediately and advised to put he patient on the bed, then patient became awake. When EMS came she was hypotensive to SBP 80s, unknown HR, however her BP improved with no intervention and she returned to baseline mental status.  In ED, afebrile, VSS, Na 151, lactate 4, trop 0.08, BNP 9K. EKG showed 1st degree AV block (MI of 250), CT H showed non specific periventricular hypodensities,     A/P:     Acute bilateral Pulmonary embolism:   Chest CT angio showed Acute bilateral main pulmonary artery emboli extending into segmental and subsegmental branches of all pulmonary lobes, with evidence of right heart strain.  EKG showed Mac patricia white sign.  D-dimer was very elevated.   Echo showed severe Pulmonary HTN and moderate to severe TR.   Hemodynamically stabl.e   Continue Lovenox for now, transition to NOAC before discharge.   Pulmonary consult.     Syncopal Episode:   Likely from acute pulmonary embolism,  can't rule out arrhythmia   Check orthostatic.   Continue Telemetry monitor.   Head CT   EEG  generalized slowing .    Hypotension:   Improved.     Hypernatremia: Mild.   likely from poor oral intake.   Continue D5 50cc per hour   BMP monitor every 12 hrs.    Elevated troponin and Pro-BNP:   likely from Acute PE and right heart strain.      Dementia:   stable.        CODE STATUS: patient is full code, as per her daughter , patient told them in the past she wants everything to be done, her daughter will discuss this statement with her brother to consider DNR/DNI.     DVT PPX: On therapeutic anticoagulation, Lovenox.     Progress Note Handoff:  Pending (specify):  Consults____Pulmonary_, medical stability.   Family discussion: CODE STATUS: patient is full code, as per her daughter , patient told them in the past she wants everything to be done, her daughter will discuss this statement with her brother to consider DNR/DNI  Disposition: Family would like the patient to go back home once stable, consult PT and rehab. 89F with PMH of dementia and HTN was brought to ED for syncopal episode. As per daughter patient was feeling weak for the last few days, yesterday patient was at home walking with her walker when she felt weak, she helped her to sit down on the chair and suddenly patient became unresponsive and looks like her breath stopped, she called the ambulance immediately and advised to put he patient on the bed, then patient became awake. When EMS came she was hypotensive to SBP 80s, unknown HR, however her BP improved with no intervention and she returned to baseline mental status.  In ED, afebrile, VSS, Na 151, lactate 4, trop 0.08, BNP 9K. EKG showed 1st degree AV block (NC of 250), CT H showed non specific periventricular hypodensities,     A/P:     Acute bilateral Pulmonary embolism:   Chest CT angio showed Acute bilateral main pulmonary artery emboli extending into segmental and subsegmental branches of all pulmonary lobes, with evidence of right heart strain.  EKG showed Reji white sign.  D-dimer was very elevated.   Echo showed severe Pulmonary HTN and moderate to severe TR.   Hemodynamically stabl.e   Continue Lovenox for now, transition to NOAC before discharge.   Pulmonary consult.     Syncopal Episode:   Likely from acute pulmonary embolism,  can't rule out arrhythmia   Check orthostatic.   Continue Telemetry monitor.   Head CT   EEG  generalized slowing .    Hypotension:   Improved.     Hypernatremia: Mild.   likely from poor oral intake.   Continue D5 50cc per hour   BMP monitor every 12 hrs.    Elevated troponin and Pro-BNP:   likely from Acute PE and right heart strain.      Dementia:   stable.        CODE STATUS: patient is full code, as per her daughter , patient told them in the past she wants everything to be done, her daughter will discuss this statement with her brother to consider DNR/DNI.     DVT PPX: On therapeutic anticoagulation, Lovenox.     Progress Note Handoff:  Pending (specify):  Consults____Pulmonary_, medical stability.   Family discussion: CODE STATUS: patient is full code, as per her daughter , patient told them in the past she wants everything to be done, her daughter will discuss this statement with her brother to consider DNR/DNI  Disposition: Family would like the patient to go back home once stable, consult PT and rehab.

## 2019-02-21 NOTE — CHART NOTE - NSCHARTNOTEFT_GEN_A_CORE
CTA angio of the chest shows Acute bilateral main pulmonary artery emboli extending into segmental and subsegmental branches of all lobes with CT evidence of right heart strain.    Will start the patient on weight based Lovenox for anticoagulation. Family aware and agrees with the plan.     F/U Coagulation CTA angio of the chest shows Acute bilateral main pulmonary artery emboli extending into segmental and subsegmental branches of all lobes with CT evidence of right heart strain.  Pt is hemodynamically stable. Vitals wnl. Alert, baseline dementia.   Given the age of the patient and that she is hemodynamically stable, she is unlikely to be eligible for thrombolytic therapy. Consider pulmonary consult for the same.   Will start the patient on weight based Lovenox for anticoagulation. F/u coagulation profile in the AM.   Family aware and agrees with the plan. CTA angio of the chest shows Acute bilateral main pulmonary artery emboli extending into segmental and subsegmental branches of all lobes with CT evidence of right heart strain.  Pt is hemodynamically stable. Vitals wnl. Alert, baseline dementia.   Given the age of the patient and that she is hemodynamically stable, she is unlikely to be eligible for thrombolytic therapy. Consider pulmonary consult for the same.   Will start the patient on weight based Lovenox for anticoagulation. F/u coagulation profile in the AM.   Family aware and agrees with the plan. Plan d/w PGY2 on call CTA angio of the chest shows Acute bilateral main pulmonary artery emboli extending into segmental and subsegmental branches of all lobes with CT evidence of right heart strain.  Pt is hemodynamically stable. Vitals wnl. Alert, baseline dementia.   Given the age of the patient and that she is hemodynamically stable, she is unlikely to be eligible for thrombolytic therapy. Consider pulmonary consult to assess the risks/benefits of the thrombolysis, and if the family wants it.  Will start the patient on weight based Lovenox for anticoagulation. F/u coagulation profile in the AM.   Family aware and agrees with the plan. Plan d/w PGY2 on call CTA angio of the chest shows Acute bilateral main pulmonary artery emboli extending into segmental and subsegmental branches of all lobes with CT evidence of right heart strain.  Pt is hemodynamically stable. Vitals wnl. Alert, baseline dementia.   Given the age of the patient and that she is hemodynamically stable, she is unlikely to be eligible for thrombolytic therapy. Consider pulmonary consult to assess the risks/benefits of the thrombolysis, and if the family wants it.  Will start the patient on weight based Lovenox for anticoagulation. F/u coagulation profile in the AM.   Family aware and agrees with the plan.   Consider palliative care to discuss GOC. Plan d/w PGY2 on call

## 2019-02-21 NOTE — PROGRESS NOTE ADULT - SUBJECTIVE AND OBJECTIVE BOX
SUBJECTIVE:    Patient is a 89y old Female who presents with a chief complaint of AMS (21 Feb 2019 12:48)    Currently admitted to medicine with the primary diagnosis of Syncope, unspecified syncope type     Today is hospital day 2d. This morning she is resting comfortably in bed and reports no new issues or overnight events.     PAST MEDICAL & SURGICAL HISTORY  Dementia  History of hip replacement  History of cholecystectomy    SOCIAL HISTORY:  Negative for smoking/alcohol/drug use.     Home Medications:  Home Medications:  Exelon 9.5 mg/24 hr transdermal film, extended release: 1 patch transdermal once a day (19 Feb 2019 21:10)  memantine 10 mg oral tablet: 1 tab(s) orally 2 times a day (19 Feb 2019 21:10)      ALLERGIES:  No Known Allergies    MEDICATIONS:  STANDING MEDICATIONS  aspirin  chewable 81 milliGRAM(s) Oral daily  chlorhexidine 4% Liquid 1 Application(s) Topical <User Schedule>  dextrose 5%. 1000 milliLiter(s) IV Continuous <Continuous>  enoxaparin Injectable 70 milliGRAM(s) SubCutaneous every 12 hours  memantine 10 milliGRAM(s) Oral two times a day    PRN MEDICATIONS    VITALS:   Vital Signs Last 24 Hrs  T(C): 36.8 (21 Feb 2019 14:36), Max: 36.8 (20 Feb 2019 23:50)  T(F): 98.3 (21 Feb 2019 14:36), Max: 98.3 (21 Feb 2019 14:36)  HR: 62 (21 Feb 2019 14:36) (62 - 73)  BP: 120/60 (21 Feb 2019 14:36) (112/91 - 139/65)  BP(mean): --  RR: 18 (21 Feb 2019 14:36) (18 - 18)  SpO2: 99% (21 Feb 2019 14:36) (96% - 99%)  CAPILLARY BLOOD GLUCOSE          LABS:                        10.4   7.64  )-----------( 165      ( 20 Feb 2019 08:21 )             34.1     02-21    149<H>  |  109  |  29<H>  ----------------------------<  96  4.3   |  31  |  0.7    Ca    9.1      21 Feb 2019 07:42  Mg     2.0     02-21      PT/INR - ( 21 Feb 2019 07:42 )   PT: 13.80 sec;   INR: 1.20 ratio         PTT - ( 21 Feb 2019 07:42 )  PTT:29.7 sec  Urinalysis Basic - ( 20 Feb 2019 00:30 )    Color: Dark Yellow / Appearance: Clear / SG: >=1.030 / pH: x  Gluc: x / Ketone: Negative  / Bili: Negative / Urobili: 0.2   Blood: x / Protein: 30 / Nitrite: Negative   Leuk Esterase: Negative / RBC: x / WBC x   Sq Epi: x / Non Sq Epi: x / Bacteria: x        Troponin T, Serum: 0.07 ng/mL <HH> (02-20-19 @ 21:25)      CARDIAC MARKERS ( 20 Feb 2019 21:25 )  x     / 0.07 ng/mL / x     / x     / x      CARDIAC MARKERS ( 20 Feb 2019 08:21 )  x     / 0.08 ng/mL / x     / x     / 3.5 ng/mL  CARDIAC MARKERS ( 19 Feb 2019 21:54 )  x     / 0.07 ng/mL / x     / x     / x          RADIOLOGY:    PHYSICAL EXAM:  GEN: No acute distress  LUNGS: Clear to auscultation bilaterally   HEART: S1/S2 present. RRR.   ABD: Soft, non-tender, non-distended. Bowel sounds present  EXT: NC/NC/NE/2+PP/BUTLER  NEURO: AAOX1

## 2019-02-21 NOTE — PROGRESS NOTE ADULT - SUBJECTIVE AND OBJECTIVE BOX
YURI GOLD  89y  Female      Patient is a 89y old  Female who presents with a chief complaint of AMS (21 Feb 2019 09:42)      INTERVAL HPI/OVERNIGHT EVENTS:  She still with SOB, no overnight events.     Vital Signs Last 24 Hrs  T(C): 36.8 (20 Feb 2019 23:50), Max: 36.8 (20 Feb 2019 23:50)  T(F): 98.2 (20 Feb 2019 23:50), Max: 98.2 (20 Feb 2019 23:50)  HR: 73 (20 Feb 2019 23:50) (67 - 73)  BP: 112/91 (20 Feb 2019 23:50) (112/91 - 139/65)  BP(mean): --  RR: 18 (20 Feb 2019 23:50) (18 - 18)  SpO2: 96% (20 Feb 2019 23:50) (96% - 98%)            Consultant(s) Notes Reviewed:  [x ] YES  [ ] NO          MEDICATIONS  (STANDING):  aspirin  chewable 81 milliGRAM(s) Oral daily  chlorhexidine 4% Liquid 1 Application(s) Topical <User Schedule>  dextrose 5%. 1000 milliLiter(s) (50 mL/Hr) IV Continuous <Continuous>  dextrose 5%. 1000 milliLiter(s) (50 mL/Hr) IV Continuous <Continuous>  enoxaparin Injectable 70 milliGRAM(s) SubCutaneous every 12 hours  memantine 10 milliGRAM(s) Oral two times a day    MEDICATIONS  (PRN):      LABS                          10.4   7.64  )-----------( 165      ( 20 Feb 2019 08:21 )             34.1     02-21    149<H>  |  109  |  29<H>  ----------------------------<  96  4.3   |  31  |  0.7    Ca    9.1      21 Feb 2019 07:42  Mg     2.0     02-21        Urinalysis Basic - ( 20 Feb 2019 00:30 )    Color: Dark Yellow / Appearance: Clear / SG: >=1.030 / pH: x  Gluc: x / Ketone: Negative  / Bili: Negative / Urobili: 0.2   Blood: x / Protein: 30 / Nitrite: Negative   Leuk Esterase: Negative / RBC: x / WBC x   Sq Epi: x / Non Sq Epi: x / Bacteria: x      PT/INR - ( 21 Feb 2019 07:42 )   PT: 13.80 sec;   INR: 1.20 ratio         PTT - ( 21 Feb 2019 07:42 )  PTT:29.7 sec  Lactate Trend  02-20 @ 00:41 Lactate:1.4     CARDIAC MARKERS ( 20 Feb 2019 21:25 )  x     / 0.07 ng/mL / x     / x     / x      CARDIAC MARKERS ( 20 Feb 2019 08:21 )  x     / 0.08 ng/mL / x     / x     / 3.5 ng/mL  CARDIAC MARKERS ( 19 Feb 2019 21:54 )  x     / 0.07 ng/mL / x     / x     / x          CAPILLARY BLOOD GLUCOSE            RADIOLOGY & ADDITIONAL TESTS:    Imaging Personally Reviewed:  [ ] YES  [ ] NO    HEALTH ISSUES - PROBLEM Dx:        PHYSICAL EXAM:  GENERAL: NAD, well-developed  HEAD:  Atraumatic, Normocephalic  EYES: EOMI, PERRLA, conjunctiva and sclera clear  NECK: Supple, No JVD  CHEST/LUNG: Clear to auscultation bilaterally; No wheeze  HEART: Regular rate and rhythm; S1 S2  ABDOMEN: Soft, Nontender, Nondistended; Bowel sounds present  EXTREMITIES:  2+ Peripheral Pulses, No clubbing, cyanosis, or edema  PSYCH: Alert, demented.   NEUROLOGY: non-focal

## 2019-02-21 NOTE — PROGRESS NOTE ADULT - ASSESSMENT
89F with PMH of dementia and HTN was brought to ED for syncopal episode    A/P:     Acute bilateral Pulmonary embolism:   Chest CT angio showed Acute bilateral main pulmonary artery emboli extending into segmental and subsegmental branches of all pulmonary lobes, with evidence of right heart strain.  EKG showed Macgean white sign.  D-dimer was very elevated.   Echo showed severe Pulmonary HTN and moderate to severe TR.   Hemodynamically stabl.e   Continue Lovenox for now, transition to NOAC before discharge.   Pulmonary consult.     Syncopal Episode:   Likely from acute pulmonary embolism,  can't rule out arrhythmia   Check orthostatic.   Continue Telemetry monitor.   Head CT   EEG  generalized slowing .    Hypotension:   Improved.     Hypernatremia: Mild.   likely from poor oral intake.   Continue D5 50cc per hour   BMP monitor every 12 hrs.    Elevated troponin and Pro-BNP:   likely from Acute PE and right heart strain.      Dementia:   stable.        CODE STATUS: patient is full code, as per her daughter , patient told them in the past she wants everything to be done, her daughter will discuss this statement with her brother to consider DNR/DNI.     DVT PPX: On therapeutic anticoagulation, Lovenox.

## 2019-02-22 ENCOUNTER — TRANSCRIPTION ENCOUNTER (OUTPATIENT)
Age: 84
End: 2019-02-22

## 2019-02-22 LAB
ANION GAP SERPL CALC-SCNC: 12 MMOL/L — SIGNIFICANT CHANGE UP (ref 7–14)
BUN SERPL-MCNC: 25 MG/DL — HIGH (ref 10–20)
CALCIUM SERPL-MCNC: 8.7 MG/DL — SIGNIFICANT CHANGE UP (ref 8.5–10.1)
CHLORIDE SERPL-SCNC: 105 MMOL/L — SIGNIFICANT CHANGE UP (ref 98–110)
CO2 SERPL-SCNC: 28 MMOL/L — SIGNIFICANT CHANGE UP (ref 17–32)
CREAT SERPL-MCNC: 0.7 MG/DL — SIGNIFICANT CHANGE UP (ref 0.7–1.5)
GLUCOSE SERPL-MCNC: 119 MG/DL — HIGH (ref 70–99)
HCT VFR BLD CALC: 32.4 % — LOW (ref 37–47)
HGB BLD-MCNC: 9.8 G/DL — LOW (ref 12–16)
MCHC RBC-ENTMCNC: 25.2 PG — LOW (ref 27–31)
MCHC RBC-ENTMCNC: 30.2 G/DL — LOW (ref 32–37)
MCV RBC AUTO: 83.3 FL — SIGNIFICANT CHANGE UP (ref 81–99)
NRBC # BLD: 0 /100 WBCS — SIGNIFICANT CHANGE UP (ref 0–0)
PLATELET # BLD AUTO: 188 K/UL — SIGNIFICANT CHANGE UP (ref 130–400)
POTASSIUM SERPL-MCNC: 3.9 MMOL/L — SIGNIFICANT CHANGE UP (ref 3.5–5)
POTASSIUM SERPL-SCNC: 3.9 MMOL/L — SIGNIFICANT CHANGE UP (ref 3.5–5)
RBC # BLD: 3.89 M/UL — LOW (ref 4.2–5.4)
RBC # FLD: 21.2 % — HIGH (ref 11.5–14.5)
SODIUM SERPL-SCNC: 145 MMOL/L — SIGNIFICANT CHANGE UP (ref 135–146)
WBC # BLD: 6.99 K/UL — SIGNIFICANT CHANGE UP (ref 4.8–10.8)
WBC # FLD AUTO: 6.99 K/UL — SIGNIFICANT CHANGE UP (ref 4.8–10.8)

## 2019-02-22 RX ORDER — APIXABAN 2.5 MG/1
10 TABLET, FILM COATED ORAL EVERY 12 HOURS
Qty: 0 | Refills: 0 | Status: DISCONTINUED | OUTPATIENT
Start: 2019-02-22 | End: 2019-02-26

## 2019-02-22 RX ORDER — SODIUM CHLORIDE 9 MG/ML
1000 INJECTION, SOLUTION INTRAVENOUS
Qty: 0 | Refills: 0 | Status: DISCONTINUED | OUTPATIENT
Start: 2019-02-22 | End: 2019-02-25

## 2019-02-22 RX ORDER — APIXABAN 2.5 MG/1
2 TABLET, FILM COATED ORAL
Qty: 24 | Refills: 0 | OUTPATIENT
Start: 2019-02-22 | End: 2019-02-27

## 2019-02-22 RX ADMIN — APIXABAN 10 MILLIGRAM(S): 2.5 TABLET, FILM COATED ORAL at 18:23

## 2019-02-22 RX ADMIN — Medication 81 MILLIGRAM(S): at 12:17

## 2019-02-22 RX ADMIN — MEMANTINE HYDROCHLORIDE 10 MILLIGRAM(S): 10 TABLET ORAL at 22:09

## 2019-02-22 RX ADMIN — SODIUM CHLORIDE 50 MILLILITER(S): 9 INJECTION, SOLUTION INTRAVENOUS at 18:52

## 2019-02-22 RX ADMIN — ENOXAPARIN SODIUM 70 MILLIGRAM(S): 100 INJECTION SUBCUTANEOUS at 06:02

## 2019-02-22 RX ADMIN — CHLORHEXIDINE GLUCONATE 1 APPLICATION(S): 213 SOLUTION TOPICAL at 06:51

## 2019-02-22 RX ADMIN — MEMANTINE HYDROCHLORIDE 10 MILLIGRAM(S): 10 TABLET ORAL at 06:03

## 2019-02-22 NOTE — SWALLOW BEDSIDE ASSESSMENT ADULT - SLP PERTINENT HISTORY OF CURRENT PROBLEM
89F with PMHx of dementia and HTN presents for "freezing"/brief change in mental status. R pulmonary emboli, chronic R temportal  infarct

## 2019-02-22 NOTE — PHYSICAL THERAPY INITIAL EVALUATION ADULT - SPECIFY REASON(S)
245-255pm; Chart reviewed. Pt. seen in semireclined position. Pt refused activity due to feeling very tired and asked to rest at this time. Will reattempt PT initial evaluation once appropriate

## 2019-02-22 NOTE — DISCHARGE NOTE ADULT - PATIENT PORTAL LINK FT
You can access the [facility] Patient Portal, offered by [facility], by registering with the following website: [email or web address]

## 2019-02-22 NOTE — SWALLOW BEDSIDE ASSESSMENT ADULT - COMMENTS
dysphagia evaluation conducted bedside. pt received alert, responsive, with some confusion. normal breath patterns, on room air. pt's daughter present bedside. pt's daughter expressed for mom to be on softer, cut up food, as it's easy to chew. no c/o pain, discomfort noted. +toleration mild oral dysphagia moderate oral dysphagia

## 2019-02-22 NOTE — DISCHARGE NOTE ADULT - PLAN OF CARE
Continue medical therapy and outpatient follow up You were found to have acute pulmonary embolism and right leg deep vein thrombosis. You are hemodynamically stable and in no acute respiratory distress. You will need to continue to take the prescribed blood thinner every day and follow up with your primary care physician and pulmonologist. If you have any signs of respiratory distress, please return to the ED immediately. You were found to have acute pulmonary embolism and right leg deep vein thrombosis. You are hemodynamically stable and in no acute respiratory distress. You will need to continue to take the prescribed blood thinner every day and follow up with your primary care physician and pulmonologist. Call your doctor sooner if you have swelling pain or redness of one of the legs, of chest pain difficulty breathing palpitations or dizziness   Apixaban is a blood thinner and is associated with increased risk of bleeding, if you have any signs of bleeding stop taking the Eliquis and call your doctor immediately for advise   Home oxygen has been set up for you to help with your low oxygen level as well as difficulty breathing

## 2019-02-22 NOTE — DISCHARGE NOTE ADULT - HOSPITAL COURSE
89F with PMHx of dementia and HTN presents for "freezing"/brief change in mental status. Patient is a poor historian, history per daughter. Since yesterday patient's gait has been altered, appearing "knock kneed" and slower, and appeared slightly weaker than normal, however patient had no other specific complaints or changes. Today patient was walking with walker, when she suddenly froze with eyes open and staring straight ahead. She was grabbed by her daughter and aide and placed in a chair, and was in this state for approximately 5-10 minutes. Additionally, she appeared to only be breathing out of the side of her mouth. When EMS came she was hypotensive to SBP 80s, unknown HR, however her BP improved with no intervention and she returned to baseline mental status.  In ED, afebrile, VSS, Na 151, lactate 4, trop 0.08, BNP ~53647, CXR no effusions or congestion on my read, given 2L LR. EKG showed 1st degree AV block (DC of 250), CT H showed non specific nika-ventrical hypodensities, CT chest show acute pulmonary embolism at bilateral main stem bronchus extending to segmental and subsegmental branches with right heart strain, and a right popliteal DVT. Patient was placed on Lovenox and then switch to NoAC for PE treatment. Patient has been hemodynamically stable and has no respiratory distress throughout the hospital stay. Patient is stable to be discharged home and is advised to follow up with PCP and Pulmonologist outpatient.

## 2019-02-22 NOTE — CONSULT NOTE ADULT - ASSESSMENT
IMPRESSION: Rehab of gait dysfunction      PRECAUTIONS: [  ] Cardiac  [  ] Respiratory  [  ] Seizures [  ] Contact Isolation  [  ] Droplet Isolation  [  ] Other    Weight Bearing Status:     RECOMMENDATION:    Out of Bed to Chair     DVT/Decubiti Prophylaxis    REHAB PLAN:     [ x  ] Bedside P/T 3-5 times a week   [   ]   Bedside O/T  2-3 times a week             [   ] No Rehab Therapy Indicated                   [   ]  Speech Therapy   Conditioning/ROM                                    ADL  Bed Mobility                                               Conditioning/ROM  Transfers                                                     Bed Mobility  Sitting /Standing Balance                         Transfers                                        Gait Training                                               Sitting/Standing Balance  Stair Training [   ]Applicable                    Home equipment Eval                                                                        Splinting  [   ] Only      GOALS:   ADL   [   ]   Independent                    Transfers  [ x  ] Independent                          Ambulation  [ x  ] Independent     [ x   ] With device                            [   ]  CG                                                         [   ]  CG                                                                  [   ] CG                            [    ] Min A                                                   [   ] Min A                                                              [   ] Min  A          DISCHARGE PLAN:   [   ]  Good candidate for Intensive Rehabilitation/Hospital based                                             Will tolerate 3hrs Intensive Rehab Daily                                       [ x   ]  Short Term Rehab in Skilled Nursing Facility vs                                       [  xx  ]  Home with Outpatient or VN services/ 24hr care                                         [    ]  Possible Candidate for Intensive Hospital based Rehab

## 2019-02-22 NOTE — DISCHARGE NOTE ADULT - CARE PLAN
Principal Discharge DX:	Other acute pulmonary embolism with acute cor pulmonale  Goal:	Continue medical therapy and outpatient follow up  Assessment and plan of treatment:	You were found to have acute pulmonary embolism and right leg deep vein thrombosis. You are hemodynamically stable and in no acute respiratory distress. You will need to continue to take the prescribed blood thinner every day and follow up with your primary care physician and pulmonologist. If you have any signs of respiratory distress, please return to the ED immediately. Principal Discharge DX:	Other acute pulmonary embolism with acute cor pulmonale  Goal:	Continue medical therapy and outpatient follow up  Assessment and plan of treatment:	You were found to have acute pulmonary embolism and right leg deep vein thrombosis. You are hemodynamically stable and in no acute respiratory distress. You will need to continue to take the prescribed blood thinner every day and follow up with your primary care physician and pulmonologist. Call your doctor sooner if you have swelling pain or redness of one of the legs, of chest pain difficulty breathing palpitations or dizziness   Apixaban is a blood thinner and is associated with increased risk of bleeding, if you have any signs of bleeding stop taking the Eliquis and call your doctor immediately for advise   Home oxygen has been set up for you to help with your low oxygen level as well as difficulty breathing

## 2019-02-22 NOTE — PROGRESS NOTE ADULT - ASSESSMENT
Acute bilateral Pulmonary embolism:   Chest CT angio showed Acute bilateral main pulmonary artery emboli extending into segmental and subsegmental branches of all pulmonary lobes, with evidence of right heart strain.  Echo showed severe Pulmonary HTN and moderate to severe TR.   Hemodynamically stabl.e   switch to po eliquis 10 BID for one week then 5 BID   OPT follow up with pulm     Syncopal Episode:   Likely from acute pulmonary embolism,    Hypotension:   Improved.     Hypernatremia: Mild.   resolved     Dementia:   stable.        PT/Rehab eval and dispo planning

## 2019-02-22 NOTE — PROGRESS NOTE ADULT - ASSESSMENT
Impression:    Submassive PE  HO dementia    Recommendations:    L  AC on LMWH; Can transition to DoAC on discharge ( eliquis)  Wean off O2; Target So2 > 92%  Discussed with daughter at bedside and son (physician) over phone in length; Considering age and functional status decision was made to pursue conservative medical management and not catheter directed thrombolysis.   They understand bleeding risk with AC however as benefit outweighs risk they agreed with DoAC for now as an AC choice.   pox ra  OOB to chair  Outpatient FU

## 2019-02-22 NOTE — SWALLOW BEDSIDE ASSESSMENT ADULT - SWALLOW EVAL: DIAGNOSIS
+toleration of thins, Dysphagia diet I puree consistency, Dysphagia Diet II mechanical soft consistency with ground meat, mild oral dysphagia for Dysphagia Diet III Mechanical soft consistency with cut up meats, moderate oral dysphagia for regular

## 2019-02-22 NOTE — DISCHARGE NOTE ADULT - CARE PROVIDER_API CALL
[place] Lo)  Critical Care Medicine; Internal Medicine; Pulmonary Disease; Sleep Medicine  [address] [place] [address]  Phone: [phone]  Fax: [phone]  Follow Up Time:

## 2019-02-22 NOTE — PROGRESS NOTE ADULT - ASSESSMENT
YURI GOLD 89y Female  MRN#: [id number]   CODE STATUS: Full code      SUBJECTIVE  Patient is a 89y old Female who presented with a chief complaint of AMS   Currently admitted to medicine with the primary diagnosis of submassive pulmonary embolism  Today is hospital day 3d, and this morning she is resting in bed comfortably and reports no overnight events. NO events on telemetry. Patient is not in any acute respiratory distress. Patient denies chest pain, shortness of breath, fever, abdominal pain, or weakness/numbness.       OBJECTIVE  PAST MEDICAL & SURGICAL HISTORY  Dementia  History of hip replacement  History of cholecystectomy    ALLERGIES:  No Known Allergies    MEDICATIONS:  STANDING MEDICATIONS  apixaban 10 milliGRAM(s) Oral every 12 hours  aspirin  chewable 81 milliGRAM(s) Oral daily  chlorhexidine 4% Liquid 1 Application(s) Topical <User Schedule>  dextrose 5%. 1000 milliLiter(s) IV Continuous <Continuous>  memantine 10 milliGRAM(s) Oral two times a day    PRN MEDICATIONS      VITAL SIGNS: Last 24 Hours  T(C): 36.3 (22 Feb 2019 05:39), Max: 37.3 (21 Feb 2019 21:08)  T(F): 97.3 (22 Feb 2019 05:39), Max: 99.2 (21 Feb 2019 21:08)  HR: 80 (22 Feb 2019 05:39) (62 - 80)  BP: 104/58 (22 Feb 2019 05:39) (104/58 - 140/64)  BP(mean): --  RR: 17 (22 Feb 2019 05:39) (17 - 18)  SpO2: 98% (21 Feb 2019 21:08) (98% - 99%)    LABS:                        9.8    6.99  )-----------( 188      ( 22 Feb 2019 07:14 )             32.4     02-22    145  |  105  |  25<H>  ----------------------------<  119<H>  3.9   |  28  |  0.7    Ca    8.7      22 Feb 2019 07:14  Mg     2.0     02-21      PT/INR - ( 21 Feb 2019 07:42 )   PT: 13.80 sec;   INR: 1.20 ratio         PTT - ( 21 Feb 2019 07:42 )  PTT:29.7 sec    CARDIAC MARKERS ( 20 Feb 2019 21:25 )  x     / 0.07 ng/mL / x     / x     / x        RADIOLOGY:    < from: CT Angio Chest w/ IV Cont (02.20.19 @ 23:49) >  1.  Acute bilateral main pulmonary artery emboli extending into segmental   and subsegmental branches of all pulmonary lobes, with evidence of right   heart strain.    < end of copied text >      PHYSICAL EXAM:    GENERAL: NAD, well-developed, AAOx3  HEENT:  Atraumatic, Normocephalic. EOMI, PERRLA, conjunctiva and sclera clear, No JVD  PULMONARY: Clear to auscultation bilaterally; No wheeze  CARDIOVASCULAR: Regular rate and rhythm; No murmurs, rubs, or gallops  GASTROINTESTINAL: Soft, Nontender, Nondistended; Bowel sounds present  MUSCULOSKELETAL:  2+ Peripheral Pulses, No clubbing, cyanosis, or edema  NEUROLOGY: non-focal  SKIN: No rashes or lesions      ADMISSION SUMMARY  Patient is a 89y old Female who presented with a chief complaint of AMS   Currently admitted to medicine with the primary diagnosis of submassive pulmonary embolism      ASSESSMENT & PLAN  89F with PMH of dementia and HTN was brought to ED for syncopal episode    #) Acute unprovoked submassive PE  - Chest CT angio showed acute bilateral main pulmonary artery emboli extending into segmental and subsegmental branches of all pulmonary lobes, with evidence of right heart strain.  - ECHO shows severe pulm HTN and mod-severe TR  - Hemodynamically stable  - Will start Eliquis 10mg tonight BID for a total of 14 doses and then take 5mg BID for lifelong  - Pulm recs appreciated:     Syncopal Episode:   Likely from acute pulmonary embolism,  can't rule out arrhythmia   Check orthostatic.   Continue Telemetry monitor.   Head CT   EEG  generalized slowing .    Hypotension:   Improved.     Hypernatremia: Mild.   likely from poor oral intake.   Continue D5 50cc per hour   BMP monitor every 12 hrs.    Elevated troponin and Pro-BNP:   likely from Acute PE and right heart strain.      Dementia:   stable.        CODE STATUS: patient is full code, as per her daughter , patient told them in the past she wants everything to be done, her daughter will discuss this statement with her brother to consider DNR/DNI.     DVT PPX: On therapeutic anticoagulation, Lovenox.       DVT ppx:  GI ppx:  Diet:  Activity:  Lines:  Code status:  Dispo: YURI GOLD 89y Female  MRN#: [id number]   CODE STATUS: Full code      SUBJECTIVE  Patient is a 89y old Female who presented with a chief complaint of AMS   Currently admitted to medicine with the primary diagnosis of submassive pulmonary embolism  Today is hospital day 3d, and this morning she is resting in bed comfortably and reports no overnight events. NO events on telemetry. Patient is not in any acute respiratory distress. Patient denies chest pain, shortness of breath, fever, abdominal pain, or weakness/numbness.       OBJECTIVE  PAST MEDICAL & SURGICAL HISTORY  Dementia  History of hip replacement  History of cholecystectomy    ALLERGIES:  No Known Allergies    MEDICATIONS:  STANDING MEDICATIONS  apixaban 10 milliGRAM(s) Oral every 12 hours  aspirin  chewable 81 milliGRAM(s) Oral daily  chlorhexidine 4% Liquid 1 Application(s) Topical <User Schedule>  dextrose 5%. 1000 milliLiter(s) IV Continuous <Continuous>  memantine 10 milliGRAM(s) Oral two times a day    PRN MEDICATIONS      VITAL SIGNS: Last 24 Hours  T(C): 36.3 (22 Feb 2019 05:39), Max: 37.3 (21 Feb 2019 21:08)  T(F): 97.3 (22 Feb 2019 05:39), Max: 99.2 (21 Feb 2019 21:08)  HR: 80 (22 Feb 2019 05:39) (62 - 80)  BP: 104/58 (22 Feb 2019 05:39) (104/58 - 140/64)  BP(mean): --  RR: 17 (22 Feb 2019 05:39) (17 - 18)  SpO2: 98% (21 Feb 2019 21:08) (98% - 99%)    LABS:                        9.8    6.99  )-----------( 188      ( 22 Feb 2019 07:14 )             32.4     02-22    145  |  105  |  25<H>  ----------------------------<  119<H>  3.9   |  28  |  0.7    Ca    8.7      22 Feb 2019 07:14  Mg     2.0     02-21      PT/INR - ( 21 Feb 2019 07:42 )   PT: 13.80 sec;   INR: 1.20 ratio         PTT - ( 21 Feb 2019 07:42 )  PTT:29.7 sec    CARDIAC MARKERS ( 20 Feb 2019 21:25 )  x     / 0.07 ng/mL / x     / x     / x        RADIOLOGY:    < from: CT Angio Chest w/ IV Cont (02.20.19 @ 23:49) >  1.  Acute bilateral main pulmonary artery emboli extending into segmental   and subsegmental branches of all pulmonary lobes, with evidence of right   heart strain.    < end of copied text >      PHYSICAL EXAM:    GENERAL: NAD, well-developed, AAOx3  HEENT:  Atraumatic, Normocephalic. EOMI, PERRLA, conjunctiva and sclera clear, No JVD  PULMONARY: Clear to auscultation bilaterally; No wheeze  CARDIOVASCULAR: Regular rate and rhythm; No murmurs, rubs, or gallops  GASTROINTESTINAL: Soft, Nontender, Nondistended; Bowel sounds present  MUSCULOSKELETAL:  2+ Peripheral Pulses, No clubbing, cyanosis, or edema  NEUROLOGY: non-focal  SKIN: No rashes or lesions      ADMISSION SUMMARY  Patient is a 89y old Female who presented with a chief complaint of AMS   Currently admitted to medicine with the primary diagnosis of submassive pulmonary embolism      ASSESSMENT & PLAN  89F with PMH of dementia and HTN was brought to ED for syncopal episode    #) Acute unprovoked submassive PE  - Chest CT angio showed acute bilateral main pulmonary artery emboli extending into segmental and subsegmental branches of all pulmonary lobes, with evidence of right heart strain.  - ECHO shows severe pulm HTN and mod-severe TR  - Hemodynamically stable  - Will start Eliquis 10mg tonight BID for a total of 14 doses and then take 5mg BID for lifelong; family aware of the risk of NoAC  - Pulm recs appreciated: can transition to DoAC on discharge, outpatient follow up    #) Syncopal Episode:   - Likely from acute pulmonary embolism, NO events on monitor  Check orthostatic.   Continue Telemetry monitor.   Head CT   EEG  generalized slowing .    Hypotension:   Improved.     Hypernatremia: Mild.   likely from poor oral intake.   Continue D5 50cc per hour   BMP monitor every 12 hrs.    Elevated troponin and Pro-BNP:   likely from Acute PE and right heart strain.      Dementia:   stable.        CODE STATUS: patient is full code, as per her daughter , patient told them in the past she wants everything to be done, her daughter will discuss this statement with her brother to consider DNR/DNI.     DVT PPX: On therapeutic anticoagulation, Lovenox.       DVT ppx:  GI ppx:  Diet:  Activity:  Lines:  Code status:  Dispo: YURI GOLD 89y Female  MRN#: [id number]   CODE STATUS: Full code      SUBJECTIVE  Patient is a 89y old Female who presented with a chief complaint of AMS   Currently admitted to medicine with the primary diagnosis of submassive pulmonary embolism  Today is hospital day 3d, and this morning she is resting in bed comfortably and reports no overnight events. NO events on telemetry. Patient is not in any acute respiratory distress. Patient denies chest pain, shortness of breath, fever, abdominal pain, or weakness/numbness.       OBJECTIVE  PAST MEDICAL & SURGICAL HISTORY  Dementia  History of hip replacement  History of cholecystectomy    ALLERGIES:  No Known Allergies    MEDICATIONS:  STANDING MEDICATIONS  apixaban 10 milliGRAM(s) Oral every 12 hours  aspirin  chewable 81 milliGRAM(s) Oral daily  chlorhexidine 4% Liquid 1 Application(s) Topical <User Schedule>  dextrose 5%. 1000 milliLiter(s) IV Continuous <Continuous>  memantine 10 milliGRAM(s) Oral two times a day    PRN MEDICATIONS      VITAL SIGNS: Last 24 Hours  T(C): 36.3 (22 Feb 2019 05:39), Max: 37.3 (21 Feb 2019 21:08)  T(F): 97.3 (22 Feb 2019 05:39), Max: 99.2 (21 Feb 2019 21:08)  HR: 80 (22 Feb 2019 05:39) (62 - 80)  BP: 104/58 (22 Feb 2019 05:39) (104/58 - 140/64)  BP(mean): --  RR: 17 (22 Feb 2019 05:39) (17 - 18)  SpO2: 98% (21 Feb 2019 21:08) (98% - 99%)    LABS:                        9.8    6.99  )-----------( 188      ( 22 Feb 2019 07:14 )             32.4     02-22    145  |  105  |  25<H>  ----------------------------<  119<H>  3.9   |  28  |  0.7    Ca    8.7      22 Feb 2019 07:14  Mg     2.0     02-21      PT/INR - ( 21 Feb 2019 07:42 )   PT: 13.80 sec;   INR: 1.20 ratio         PTT - ( 21 Feb 2019 07:42 )  PTT:29.7 sec    CARDIAC MARKERS ( 20 Feb 2019 21:25 )  x     / 0.07 ng/mL / x     / x     / x        RADIOLOGY:    < from: CT Angio Chest w/ IV Cont (02.20.19 @ 23:49) >  1.  Acute bilateral main pulmonary artery emboli extending into segmental   and subsegmental branches of all pulmonary lobes, with evidence of right   heart strain.    < end of copied text >    < from: CT Head No Cont (02.19.19 @ 15:49) >    1.  No acute intracranial hemorrhage.    2.  Chronic right temporal lobe infarct.    3.  Periventricular white matter hypodensities, nonspecific, differential   diagnostic possibilities include ischemic change, gliosis or   demyelination.    < end of copied text >      < from: [facility] Duplex Lower Ext Vein Scan, Bilat (02.21.19 @ 13:41) >  Acute right popliteal vein DVT. No evidence of DVT or superficial   thrombophlebitis left lower extremity.    < end of copied text >      PHYSICAL EXAM:    GENERAL: NAD, well-developed, awake and alert  HEENT:  Atraumatic, Normocephalic. Conjunctiva pink and cornea white, No JVD  PULMONARY: Bibasilar crackles, poor respiratory effort; No wheeze  CARDIOVASCULAR: regular rate and rhythm; No murmurs  GASTROINTESTINAL: Soft, Nontender, Nondistended; Bowel sounds present  MUSCULOSKELETAL:  2+ Peripheral Pulses, No petal edema  NEUROLOGY: non-focal  SKIN: No rashes or lesions      ADMISSION SUMMARY  Patient is a 89y old Female who presented with a chief complaint of AMS   Currently admitted to medicine with the primary diagnosis of submassive pulmonary embolism      ASSESSMENT & PLAN  89F with PMH of dementia and HTN was brought to ED for syncopal episode    #) Acute unprovoked submassive PE  - Chest CT angio showed acute bilateral main pulmonary artery emboli extending into segmental and subsegmental branches of all pulmonary lobes, with evidence of right heart strain.  - ECHO shows severe pulm HTN and mod-severe TR  - Hemodynamically stable  - Will start Eliquis 10mg tonight BID for a total of 14 doses and then take 5mg BID for lifelong; family aware of the risk of NoAC  - Pulm recs appreciated: can transition to DoAC on discharge, outpatient follow up    #) Acute right popliteal DVT  - Continue Eliquis    #) Syncopal Episode:   - Likely from acute pulmonary embolism, NO events on monitor  - Check orthostatic   - Head CT negative for acute infarct  - EEG: generalized slowing, no seizure    #) Hypernatremia: Mild.   - likely from poor oral intake.   - Continue D5 50cc per hour   - BMP monitor daily    #) Dementia  - stable.    - Continue Exelon patch and mamantine    #) Deconditioning  - Patient is on 2 person maximum assist, will need Jose lift for transfer from bed to chair. Without Jose lift she will be confound to bed  - See PT eval      DVT ppx: Lovenox now, will switch to Eliquis tonight  GI ppx: Not indicated  Diet: Dysphagia 3 with thin liquid  Activity: OOBTC  Lines: Peripheral IVs  Code status: Full code  Dispo: d/c planning

## 2019-02-22 NOTE — DISCHARGE NOTE ADULT - ADDITIONAL INSTRUCTIONS
Please follow up with:  PCP in 1-2 weeks  Pulmonologist Dr. Jones in 1-2 weeks Please follow up with:  PCP in 1-2 weeks  Pulmonologist Dr. Jones in 1-2 weeks    Call 911 or report to the nearest hospital in case of emergency

## 2019-02-22 NOTE — DISCHARGE NOTE ADULT - OTHER SIGNIFICANT FINDINGS
< from: CT Angio Chest w/ IV Cont (02.20.19 @ 23:49) >  PULMONARY EMBOLUS: Acute right main pulmonary artery emboli extending   into all right segmental branches. Acute left main pulmonary artery   emboli extending into lingular and left lower lobe segmental branches.   Subsegmental left upper lobe and left lower lobe pulmonary embolus.    LUNGS: Mild degree of mosaic attenuation seen diffusely, related to   perfusion defect diffusely versus small airways disease. No lobar   consolidation, effusion or pneumothorax. Central airways are patent.    PLEURA/PERICARDIUM: Unremarkable..    MEDIASTINUM/THORACIC NODES: Unremarkable.    VISUALIZED UPPER ABDOMEN: Unremarkable.    BONES/SOFT TISSUES: Degenerative changes.    OTHER: CT evidence for right heart strain with greatest right ventricle   axial dimension of 5.8 cm in greatest left ventricle axial dimension of   4.0 cm. The left atrium is small relative to the right atrium.   Atherosclerotic vascular calcifications of the coronary arteries and   aorta.    < end of copied text >

## 2019-02-22 NOTE — PROGRESS NOTE ADULT - SUBJECTIVE AND OBJECTIVE BOX
OVERNIGHT EVENTS: le doppler r dvt    Vital Signs Last 24 Hrs  T(C): 36.3 (22 Feb 2019 05:39), Max: 37.3 (21 Feb 2019 21:08)  T(F): 97.3 (22 Feb 2019 05:39), Max: 99.2 (21 Feb 2019 21:08)  HR: 80 (22 Feb 2019 05:39) (62 - 80)  BP: 104/58 (22 Feb 2019 05:39) (104/58 - 140/64)  RR: 17 (22 Feb 2019 05:39) (17 - 18)  SpO2: 98% (21 Feb 2019 21:08) (98% - 99%)    PHYSICAL EXAMINATION:    GENERAL: The patient is awake and alert in no apparent distress.     HEENT: Head is normocephalic and atraumatic. Extraocular muscles are intact. Mucous membranes are moist.    NECK: Supple.    LUNGS: good air entry    HEART: Regular rate and rhythm without murmur.    ABDOMEN: Soft, nontender, and nondistended.      EXTREMITIES: Without any cyanosis, clubbing, rash, lesions or edema.    NEUROLOGIC: Grossly intact.    SKIN: No ulceration or induration present.      LABS:                        9.8    6.99  )-----------( 188      ( 22 Feb 2019 07:14 )             32.4     02-22    145  |  105  |  25<H>  ----------------------------<  119<H>  3.9   |  28  |  0.7    Ca    8.7      22 Feb 2019 07:14  Mg     2.0     02-21      PT/INR - ( 21 Feb 2019 07:42 )   PT: 13.80 sec;   INR: 1.20 ratio         PTT - ( 21 Feb 2019 07:42 )  PTT:29.7 sec      CARDIAC MARKERS ( 20 Feb 2019 21:25 )  x     / 0.07 ng/mL / x     / x     / x                      02-22-19 @ 07:01  -  02-22-19 @ 13:42  --------------------------------------------------------  IN: 480 mL / OUT: 0 mL / NET: 480 mL        MICROBIOLOGY:      MEDICATIONS  (STANDING):  apixaban 10 milliGRAM(s) Oral every 12 hours  aspirin  chewable 81 milliGRAM(s) Oral daily  chlorhexidine 4% Liquid 1 Application(s) Topical <User Schedule>  dextrose 5%. 1000 milliLiter(s) (50 mL/Hr) IV Continuous <Continuous>  memantine 10 milliGRAM(s) Oral two times a day    MEDICATIONS  (PRN):      RADIOLOGY & ADDITIONAL STUDIES:

## 2019-02-22 NOTE — DISCHARGE NOTE ADULT - MEDICATION SUMMARY - MEDICATIONS TO TAKE
I will START or STAY ON the medications listed below when I get home from the hospital:    Eliquis 5 mg oral tablet  -- 2 tab(s) by mouth 2 times a day for a total of 14 doses, then take 5mg twice daily for lifelong  -- Check with your doctor before becoming pregnant.  It is very important that you take or use this exactly as directed.  Do not skip doses or discontinue unless directed by your doctor.  Obtain medical advice before taking any non-prescription drugs as some may affect the action of this medication.    -- Indication: For Pulmonary embolism     Eliquis 5 mg oral tablet  -- 1 tab(s) by mouth 2 times a day, please start 5mg twice daily starting on the night of 3/1/19  -- Check with your doctor before becoming pregnant.  It is very important that you take or use this exactly as directed.  Do not skip doses or discontinue unless directed by your doctor.  Obtain medical advice before taking any non-prescription drugs as some may affect the action of this medication.    -- Indication: For Pulmonary embolism     Exelon 9.5 mg/24 hr transdermal film, extended release  -- 1 patch by transdermal patch once a day  -- Indication: For Dementia     memantine 10 mg oral tablet  -- 1 tab(s) by mouth 2 times a day  -- Indication: For Dementia

## 2019-02-22 NOTE — PROGRESS NOTE ADULT - SUBJECTIVE AND OBJECTIVE BOX
no chest pain and no sob  patient doing well     Vital Signs Last 24 Hrs  T(C): 36.3 (22 Feb 2019 05:39), Max: 37.3 (21 Feb 2019 21:08)  T(F): 97.3 (22 Feb 2019 05:39), Max: 99.2 (21 Feb 2019 21:08)  HR: 80 (22 Feb 2019 05:39) (62 - 80)  BP: 104/58 (22 Feb 2019 05:39) (104/58 - 140/64)  BP(mean): --  RR: 17 (22 Feb 2019 05:39) (17 - 18)  SpO2: 98% (21 Feb 2019 21:08) (98% - 99%)    PHYSICAL EXAM:  GENERAL: NAD, well-developed  HEAD:  Atraumatic, Normocephalic  EYES: EOMI, PERRLA, conjunctiva and sclera clear  NECK: Supple, No JVD  Pulm: Clear to auscultation bilaterally; No wheeze  CV: Regular rate and rhythm; No murmurs, rubs, or gallops  GI: Soft, Nontender, Nondistended; Bowel sounds present  EXTREMITIES:  2+ Peripheral Pulses, No clubbing, cyanosis  PSYCH: AAOx3  NEUROLOGY: non-focal  SKIN: No rashes or lesions                          9.8    6.99  )-----------( 188      ( 22 Feb 2019 07:14 )             32.4     02-22    145  |  105  |  25<H>  ----------------------------<  119<H>  3.9   |  28  |  0.7    Ca    8.7      22 Feb 2019 07:14  Mg     2.0     02-21        PT/INR - ( 21 Feb 2019 07:42 )   PT: 13.80 sec;   INR: 1.20 ratio         PTT - ( 21 Feb 2019 07:42 )  PTT:29.7 sec  CARDIAC MARKERS ( 20 Feb 2019 21:25 )  x     / 0.07 ng/mL / x     / x     / x

## 2019-02-22 NOTE — CONSULT NOTE ADULT - SUBJECTIVE AND OBJECTIVE BOX
HPI:  89F with PMHx of dementia and HTN presents for "freezing"/brief change in mental status. Patient is a poor historian, history per daughter. Since yesterday patient's gait has been altered, appearing "knock kneed" and slower, and appeared slightly weaker than normal, however patient had no other specific complaints or changes. Today patient was walking with walker, when she suddenly froze with eyes open and staring straight ahead. She was grabbed by her daughter and aide and placed in a chair, and was in this state for approximately 5-10 minutes. Additionally, she appeared to only be breathing out of the side of her mouth. When EMS came she was hypotensive to SBP 80s, unknown HR, however her BP improved with no intervention and she returned to baseline mental status.  In ED, afebrile, VSS, Na 151, lactate 4, trop 0.08, BNP ~11996, CXR no effusions or congestion on my read, given 2L LR. EKG showed 1st degree AV block ([place] of 250), CT H showed non specific nika-ventrical hypodensities, DDx include gliosis, demyelination or ischemic changes. UA not yet sent. (19 Feb 2019 21:01)      PAST MEDICAL & SURGICAL HISTORY:  Dementia  History of hip replacement  History of cholecystectomy      Hospital Course:    TODAY'S SUBJECTIVE & REVIEW OF SYMPTOMS:     Constitutional WNL   Cardio WNL   Resp WNL   GI WNL  Heme WNL  Endo WNL  Skin WNL  MSK Weakness  Neuro WNL  Cognitive WNL  Psych WNL      MEDICATIONS  (STANDING):  apixaban 10 milliGRAM(s) Oral every 12 hours  aspirin  chewable 81 milliGRAM(s) Oral daily  chlorhexidine 4% Liquid 1 Application(s) Topical <User Schedule>  dextrose 5%. 1000 milliLiter(s) (50 mL/Hr) IV Continuous <Continuous>  memantine 10 milliGRAM(s) Oral two times a day    MEDICATIONS  (PRN):      FAMILY HISTORY:  Family history of CHF (congestive heart failure) (Mother)  Family history of diabetes mellitus (Father)      Allergies    No Known Allergies    Intolerances        SOCIAL HISTORY:    [  ] Etoh  [  ] Smoking  [  ] Substance abuse     Home Environment:  [  ] Home Alone  [x  ] Lives with Family  [x  ] Home Health Aid / 24hr    Dwelling:  [  ] Apartment  [ x ] Private House  [  ] Adult Home  [  ] Skilled Nursing Facility      [  ] Short Term  [  ] Long Term  [ x ] Stairs       Elevator [  ]    FUNCTIONAL STATUS PTA: (Check all that apply)  Ambulation: [   ]Independent    [x  ] Dependent     [  ] Non-Ambulatory  Assistive Device: [  ] SA Cane  [  ]  Q Cane  [x  ] Walker  [  ]  Wheelchair  ADL : [  ] Independent  [x  ]  Dependent       Vital Signs Last 24 Hrs  T(C): 36.3 (22 Feb 2019 05:39), Max: 37.3 (21 Feb 2019 21:08)  T(F): 97.3 (22 Feb 2019 05:39), Max: 99.2 (21 Feb 2019 21:08)  HR: 80 (22 Feb 2019 05:39) (67 - 80)  BP: 104/58 (22 Feb 2019 05:39) (104/58 - 140/64)  BP(mean): --  RR: 17 (22 Feb 2019 05:39) (17 - 18)  SpO2: 98% (21 Feb 2019 21:08) (98% - 98%)      PHYSICAL EXAM: Alert & awake  GENERAL: NAD, well-groomed, well-developed  HEAD:  Atraumatic, Normocephalic  CHEST/LUNG: Clear   HEART: S1S2+  ABDOMEN: Soft, Nontender  EXTREMITIES:  no calf tenderness    NERVOUS SYSTEM:  Cranial Nerves 2-12 intact [  ] Abnormal  [  ]  ROM: WFL all extremities [  ]  Abnormal [  ]able to move all ext  Motor Strength: WFL all extremities  [  ]  Abnormal [  ]  Sensation: intact to light touch [  ] Abnormal [  ]  Reflexes: Symmetric [  ]  Abnormal [  ]    FUNCTIONAL STATUS:  Bed Mobility: Independent [  ]  Supervision [  ]  Needs Assistance [x  ]  N/A [  ]  Transfers: Independent [  ]  Supervision [  ]  Needs Assistance [x  ]  N/A [  ]   Ambulation: Independent [  ]  Supervision [  ]  Needs Assistance [  ]  N/A [  ]  ADL: Independent [  ] Requires Assistance [  ] N/A [  ]      LABS:                        9.8    6.99  )-----------( 188      ( 22 Feb 2019 07:14 )             32.4     02-22    145  |  105  |  25<H>  ----------------------------<  119<H>  3.9   |  28  |  0.7    Ca    8.7      22 Feb 2019 07:14  Mg     2.0     02-21      PT/INR - ( 21 Feb 2019 07:42 )   PT: 13.80 sec;   INR: 1.20 ratio         PTT - ( 21 Feb 2019 07:42 )  PTT:29.7 sec      RADIOLOGY & ADDITIONAL STUDIES:    Assesment: HPI:  89F with PMHx of dementia and HTN presents for "freezing"/brief change in mental status. Patient is a poor historian, history per daughter. Since yesterday patient's gait has been altered, appearing "knock kneed" and slower, and appeared slightly weaker than normal, however patient had no other specific complaints or changes. Today patient was walking with walker, when she suddenly froze with eyes open and staring straight ahead. She was grabbed by her daughter and aide and placed in a chair, and was in this state for approximately 5-10 minutes. Additionally, she appeared to only be breathing out of the side of her mouth. When EMS came she was hypotensive to SBP 80s, unknown HR, however her BP improved with no intervention and she returned to baseline mental status.  In ED, afebrile, VSS, Na 151, lactate 4, trop 0.08, BNP ~75707, CXR no effusions or congestion on my read, given 2L LR. EKG showed 1st degree AV block ([place] of 250), CT H showed non specific nika-ventrical hypodensities, DDx include gliosis, demyelination or ischemic changes. UA not yet sent. (19 Feb 2019 21:01). + PE on ac      PAST MEDICAL & SURGICAL HISTORY:  Dementia  History of hip replacement  History of cholecystectomy      Hospital Course:    TODAY'S SUBJECTIVE & REVIEW OF SYMPTOMS:     Constitutional WNL   Cardio WNL   Resp WNL   GI WNL  Heme WNL  Endo WNL  Skin WNL  MSK Weakness  Neuro WNL  Cognitive WNL  Psych WNL      MEDICATIONS  (STANDING):  apixaban 10 milliGRAM(s) Oral every 12 hours  aspirin  chewable 81 milliGRAM(s) Oral daily  chlorhexidine 4% Liquid 1 Application(s) Topical <User Schedule>  dextrose 5%. 1000 milliLiter(s) (50 mL/Hr) IV Continuous <Continuous>  memantine 10 milliGRAM(s) Oral two times a day    MEDICATIONS  (PRN):      FAMILY HISTORY:  Family history of CHF (congestive heart failure) (Mother)  Family history of diabetes mellitus (Father)      Allergies    No Known Allergies    Intolerances        SOCIAL HISTORY:    [  ] Etoh  [  ] Smoking  [  ] Substance abuse     Home Environment:  [  ] Home Alone  [x  ] Lives with Family  [x  ] Home Health Aid / 24hr    Dwelling:  [  ] Apartment  [ x ] Private House  [  ] Adult Home  [  ] Skilled Nursing Facility      [  ] Short Term  [  ] Long Term  [ x ] Stairs       Elevator [  ]    FUNCTIONAL STATUS PTA: (Check all that apply)  Ambulation: [   ]Independent    [x  ] Dependent     [  ] Non-Ambulatory  Assistive Device: [  ] SA Cane  [  ]  Q Cane  [x  ] Walker  [  ]  Wheelchair  ADL : [  ] Independent  [x  ]  Dependent       Vital Signs Last 24 Hrs  T(C): 36.3 (22 Feb 2019 05:39), Max: 37.3 (21 Feb 2019 21:08)  T(F): 97.3 (22 Feb 2019 05:39), Max: 99.2 (21 Feb 2019 21:08)  HR: 80 (22 Feb 2019 05:39) (67 - 80)  BP: 104/58 (22 Feb 2019 05:39) (104/58 - 140/64)  BP(mean): --  RR: 17 (22 Feb 2019 05:39) (17 - 18)  SpO2: 98% (21 Feb 2019 21:08) (98% - 98%)      PHYSICAL EXAM: Alert & awake  GENERAL: NAD, well-groomed, well-developed  HEAD:  Atraumatic, Normocephalic  CHEST/LUNG: Clear   HEART: S1S2+  ABDOMEN: Soft, Nontender  EXTREMITIES:  no calf tenderness    NERVOUS SYSTEM:  Cranial Nerves 2-12 intact [  ] Abnormal  [  ]  ROM: WFL all extremities [  ]  Abnormal [  ]able to move all ext  Motor Strength: WFL all extremities  [  ]  Abnormal [  ]  Sensation: intact to light touch [  ] Abnormal [  ]  Reflexes: Symmetric [  ]  Abnormal [  ]    FUNCTIONAL STATUS:  Bed Mobility: Independent [  ]  Supervision [  ]  Needs Assistance [x  ]  N/A [  ]  Transfers: Independent [  ]  Supervision [  ]  Needs Assistance [x  ]  N/A [  ]   Ambulation: Independent [  ]  Supervision [  ]  Needs Assistance [  ]  N/A [  ]  ADL: Independent [  ] Requires Assistance [  ] N/A [  ]      LABS:                        9.8    6.99  )-----------( 188      ( 22 Feb 2019 07:14 )             32.4     02-22    145  |  105  |  25<H>  ----------------------------<  119<H>  3.9   |  28  |  0.7    Ca    8.7      22 Feb 2019 07:14  Mg     2.0     02-21      PT/INR - ( 21 Feb 2019 07:42 )   PT: 13.80 sec;   INR: 1.20 ratio         PTT - ( 21 Feb 2019 07:42 )  PTT:29.7 sec      RADIOLOGY & ADDITIONAL STUDIES:    Assesment:

## 2019-02-23 LAB
ALBUMIN SERPL ELPH-MCNC: 3.1 G/DL — LOW (ref 3.5–5.2)
ALP SERPL-CCNC: 62 U/L — SIGNIFICANT CHANGE UP (ref 30–115)
ALT FLD-CCNC: 25 U/L — SIGNIFICANT CHANGE UP (ref 0–41)
ANION GAP SERPL CALC-SCNC: 11 MMOL/L — SIGNIFICANT CHANGE UP (ref 7–14)
APTT BLD: 33 SEC — SIGNIFICANT CHANGE UP (ref 27–39.2)
AST SERPL-CCNC: 38 U/L — SIGNIFICANT CHANGE UP (ref 0–41)
BASOPHILS # BLD AUTO: 0.06 K/UL — SIGNIFICANT CHANGE UP (ref 0–0.2)
BASOPHILS NFR BLD AUTO: 0.9 % — SIGNIFICANT CHANGE UP (ref 0–1)
BILIRUB SERPL-MCNC: 0.4 MG/DL — SIGNIFICANT CHANGE UP (ref 0.2–1.2)
BUN SERPL-MCNC: 23 MG/DL — HIGH (ref 10–20)
CALCIUM SERPL-MCNC: 8.5 MG/DL — SIGNIFICANT CHANGE UP (ref 8.5–10.1)
CHLORIDE SERPL-SCNC: 106 MMOL/L — SIGNIFICANT CHANGE UP (ref 98–110)
CO2 SERPL-SCNC: 28 MMOL/L — SIGNIFICANT CHANGE UP (ref 17–32)
CREAT SERPL-MCNC: 0.7 MG/DL — SIGNIFICANT CHANGE UP (ref 0.7–1.5)
EOSINOPHIL # BLD AUTO: 0.19 K/UL — SIGNIFICANT CHANGE UP (ref 0–0.7)
EOSINOPHIL NFR BLD AUTO: 2.9 % — SIGNIFICANT CHANGE UP (ref 0–8)
GLUCOSE SERPL-MCNC: 119 MG/DL — HIGH (ref 70–99)
HCT VFR BLD CALC: 33.5 % — LOW (ref 37–47)
HGB BLD-MCNC: 10.3 G/DL — LOW (ref 12–16)
IMM GRANULOCYTES NFR BLD AUTO: 0.3 % — SIGNIFICANT CHANGE UP (ref 0.1–0.3)
INR BLD: 1.54 RATIO — HIGH (ref 0.65–1.3)
LYMPHOCYTES # BLD AUTO: 1.88 K/UL — SIGNIFICANT CHANGE UP (ref 1.2–3.4)
LYMPHOCYTES # BLD AUTO: 28.6 % — SIGNIFICANT CHANGE UP (ref 20.5–51.1)
MAGNESIUM SERPL-MCNC: 1.9 MG/DL — SIGNIFICANT CHANGE UP (ref 1.8–2.4)
MCHC RBC-ENTMCNC: 25.5 PG — LOW (ref 27–31)
MCHC RBC-ENTMCNC: 30.7 G/DL — LOW (ref 32–37)
MCV RBC AUTO: 82.9 FL — SIGNIFICANT CHANGE UP (ref 81–99)
MONOCYTES # BLD AUTO: 0.87 K/UL — HIGH (ref 0.1–0.6)
MONOCYTES NFR BLD AUTO: 13.2 % — HIGH (ref 1.7–9.3)
NEUTROPHILS # BLD AUTO: 3.56 K/UL — SIGNIFICANT CHANGE UP (ref 1.4–6.5)
NEUTROPHILS NFR BLD AUTO: 54.1 % — SIGNIFICANT CHANGE UP (ref 42.2–75.2)
NRBC # BLD: 0 /100 WBCS — SIGNIFICANT CHANGE UP (ref 0–0)
PLATELET # BLD AUTO: 207 K/UL — SIGNIFICANT CHANGE UP (ref 130–400)
POTASSIUM SERPL-MCNC: 3.9 MMOL/L — SIGNIFICANT CHANGE UP (ref 3.5–5)
POTASSIUM SERPL-SCNC: 3.9 MMOL/L — SIGNIFICANT CHANGE UP (ref 3.5–5)
PROT SERPL-MCNC: 6.1 G/DL — SIGNIFICANT CHANGE UP (ref 6–8)
PROTHROM AB SERPL-ACNC: 17.6 SEC — HIGH (ref 9.95–12.87)
RBC # BLD: 4.04 M/UL — LOW (ref 4.2–5.4)
RBC # FLD: 21.2 % — HIGH (ref 11.5–14.5)
SODIUM SERPL-SCNC: 145 MMOL/L — SIGNIFICANT CHANGE UP (ref 135–146)
WBC # BLD: 6.58 K/UL — SIGNIFICANT CHANGE UP (ref 4.8–10.8)
WBC # FLD AUTO: 6.58 K/UL — SIGNIFICANT CHANGE UP (ref 4.8–10.8)

## 2019-02-23 RX ADMIN — APIXABAN 10 MILLIGRAM(S): 2.5 TABLET, FILM COATED ORAL at 06:46

## 2019-02-23 RX ADMIN — MEMANTINE HYDROCHLORIDE 10 MILLIGRAM(S): 10 TABLET ORAL at 17:43

## 2019-02-23 RX ADMIN — MEMANTINE HYDROCHLORIDE 10 MILLIGRAM(S): 10 TABLET ORAL at 06:43

## 2019-02-23 RX ADMIN — APIXABAN 10 MILLIGRAM(S): 2.5 TABLET, FILM COATED ORAL at 17:43

## 2019-02-23 NOTE — PROGRESS NOTE ADULT - ATTENDING COMMENTS
patient seen and examined independently    discharge pending home care setup, eliquis auth and shaina lift   spent 35 min coordinating discharge

## 2019-02-23 NOTE — PROGRESS NOTE ADULT - ASSESSMENT
YURI GOLD 89y Female  MRN#: [id number]   CODE STATUS: Full code      SUBJECTIVE  Patient is a 89y old Female who presented with a chief complaint of AMS   Currently admitted to medicine with the primary diagnosis of submassive pulmonary embolism  Today is hospital day 3d, and this morning she is resting in bed comfortably and reports no overnight events. NO events on telemetry. Patient is not in any acute respiratory distress. Patient denies chest pain, shortness of breath, fever, abdominal pain, or weakness/numbness.       OBJECTIVE  PAST MEDICAL & SURGICAL HISTORY  Dementia  History of hip replacement  History of cholecystectomy    ALLERGIES:  No Known Allergies    MEDICATIONS:  STANDING MEDICATIONS  apixaban 10 milliGRAM(s) Oral every 12 hours  aspirin  chewable 81 milliGRAM(s) Oral daily  chlorhexidine 4% Liquid 1 Application(s) Topical <User Schedule>  dextrose 5%. 1000 milliLiter(s) IV Continuous <Continuous>  dextrose 5%. 1000 milliLiter(s) IV Continuous <Continuous>  memantine 10 milliGRAM(s) Oral two times a day    PRN MEDICATIONS      VITAL SIGNS: Last 24 Hours  T(C): 36.6 (23 Feb 2019 05:44), Max: 36.6 (23 Feb 2019 05:44)  T(F): 97.9 (23 Feb 2019 05:44), Max: 97.9 (23 Feb 2019 05:44)  HR: 52 (23 Feb 2019 05:44) (52 - 73)  BP: 135/71 (23 Feb 2019 05:44) (135/71 - 168/64)  BP(mean): --  RR: 17 (23 Feb 2019 05:44) (17 - 18)  SpO2: --    LABS:                        10.3   6.58  )-----------( 207      ( 23 Feb 2019 06:32 )             33.5     02-23    145  |  106  |  23<H>  ----------------------------<  119<H>  3.9   |  28  |  0.7    Ca    8.5      23 Feb 2019 06:32  Mg     1.9     02-23    TPro  6.1  /  Alb  3.1<L>  /  TBili  0.4  /  DBili  x   /  AST  38  /  ALT  25  /  AlkPhos  62  02-23    PT/INR - ( 23 Feb 2019 06:32 )   PT: 17.60 sec;   INR: 1.54 ratio         PTT - ( 23 Feb 2019 06:32 )  PTT:33.0 sec    RADIOLOGY:    < from: CT Angio Chest w/ IV Cont (02.20.19 @ 23:49) >  1.  Acute bilateral main pulmonary artery emboli extending into segmental   and subsegmental branches of all pulmonary lobes, with evidence of right   heart strain.    < end of copied text >    < from: CT Head No Cont (02.19.19 @ 15:49) >    1.  No acute intracranial hemorrhage.    2.  Chronic right temporal lobe infarct.    3.  Periventricular white matter hypodensities, nonspecific, differential   diagnostic possibilities include ischemic change, gliosis or   demyelination.    < end of copied text >      < from: [facility] Duplex Lower Ext Vein Scan, Bilat (02.21.19 @ 13:41) >  Acute right popliteal vein DVT. No evidence of DVT or superficial   thrombophlebitis left lower extremity.    < end of copied text >      PHYSICAL EXAM:    GENERAL: NAD, well-developed, awake and alert  HEENT:  Atraumatic, Normocephalic. Conjunctiva pink and cornea white, No JVD  PULMONARY: Bibasilar crackles, poor respiratory effort; No wheeze  CARDIOVASCULAR: regular rate and rhythm; No murmurs  GASTROINTESTINAL: Soft, Nontender, Nondistended; Bowel sounds present  MUSCULOSKELETAL:  2+ Peripheral Pulses, No petal edema  NEUROLOGY: non-focal  SKIN: No rashes or lesions      ADMISSION SUMMARY  Patient is a 89y old Female who presented with a chief complaint of AMS   Currently admitted to medicine with the primary diagnosis of submassive pulmonary embolism      ASSESSMENT & PLAN  89F with PMH of dementia and HTN was brought to ED for syncopal episode    #) Acute unprovoked submassive PE  - Chest CT angio showed acute bilateral main pulmonary artery emboli extending into segmental and subsegmental branches of all pulmonary lobes, with evidence of right heart strain.  - ECHO shows severe pulm HTN and mod-severe TR  - Hemodynamically stable  - Will start Eliquis 10mg tonight BID for a total of 14 doses and then take 5mg BID for lifelong; family aware of the risk of NoAC  - Pulm recs appreciated: can transition to DoAC on discharge, outpatient follow up    #) Acute right popliteal DVT  - Continue Eliquis    #) Syncopal Episode:   - Likely from acute pulmonary embolism, NO events on monitor  - Check orthostatic   - Head CT negative for acute infarct  - EEG: generalized slowing, no seizure    #) Hypernatremia - resolved  - likely from poor oral intake.   - Continue D5 50cc per hour   - BMP monitor daily    #) Dementia  - Stable.    - Continue Exelon patch and Mamantine    #) Deconditioning  - Patient is on 2 person maximum assist, will need Jose lift for transfer from bed to chair. Without Jose lift she will be confound to bed  - See PT eval      DVT ppx: Lovenox now, will switch to Eliquis tonight  GI ppx: Not indicated  Diet: Dysphagia 3 with thin liquid  Activity: OOBTC  Lines: Peripheral IVs  Code status: Full code  Dispo: d/c planning YURI GOLD 89y Female  MRN#: [id number]   CODE STATUS: Full code      SUBJECTIVE  Patient is a 89y old Female who presented with a chief complaint of AMS   Currently admitted to medicine with the primary diagnosis of submassive pulmonary embolism  Today is hospital day 3d, and this morning she is resting in bed comfortably and reports no overnight events. NO events on telemetry. Patient is not in any acute respiratory distress. Patient denies chest pain, shortness of breath, fever, abdominal pain, or weakness/numbness.       OBJECTIVE  PAST MEDICAL & SURGICAL HISTORY  Dementia  History of hip replacement  History of cholecystectomy    ALLERGIES:  No Known Allergies    MEDICATIONS:  STANDING MEDICATIONS  apixaban 10 milliGRAM(s) Oral every 12 hours  aspirin  chewable 81 milliGRAM(s) Oral daily  chlorhexidine 4% Liquid 1 Application(s) Topical <User Schedule>  dextrose 5%. 1000 milliLiter(s) IV Continuous <Continuous>  dextrose 5%. 1000 milliLiter(s) IV Continuous <Continuous>  memantine 10 milliGRAM(s) Oral two times a day    PRN MEDICATIONS      VITAL SIGNS: Last 24 Hours  T(C): 36.6 (23 Feb 2019 05:44), Max: 36.6 (23 Feb 2019 05:44)  T(F): 97.9 (23 Feb 2019 05:44), Max: 97.9 (23 Feb 2019 05:44)  HR: 52 (23 Feb 2019 05:44) (52 - 73)  BP: 135/71 (23 Feb 2019 05:44) (135/71 - 168/64)  BP(mean): --  RR: 17 (23 Feb 2019 05:44) (17 - 18)  SpO2: --    LABS:                        10.3   6.58  )-----------( 207      ( 23 Feb 2019 06:32 )             33.5     02-23    145  |  106  |  23<H>  ----------------------------<  119<H>  3.9   |  28  |  0.7    Ca    8.5      23 Feb 2019 06:32  Mg     1.9     02-23    TPro  6.1  /  Alb  3.1<L>  /  TBili  0.4  /  DBili  x   /  AST  38  /  ALT  25  /  AlkPhos  62  02-23    PT/INR - ( 23 Feb 2019 06:32 )   PT: 17.60 sec;   INR: 1.54 ratio         PTT - ( 23 Feb 2019 06:32 )  PTT:33.0 sec    RADIOLOGY:    < from: CT Angio Chest w/ IV Cont (02.20.19 @ 23:49) >  1.  Acute bilateral main pulmonary artery emboli extending into segmental   and subsegmental branches of all pulmonary lobes, with evidence of right   heart strain.    < end of copied text >    < from: CT Head No Cont (02.19.19 @ 15:49) >    1.  No acute intracranial hemorrhage.    2.  Chronic right temporal lobe infarct.    3.  Periventricular white matter hypodensities, nonspecific, differential   diagnostic possibilities include ischemic change, gliosis or   demyelination.    < end of copied text >      < from: [facility] Duplex Lower Ext Vein Scan, Bilat (02.21.19 @ 13:41) >  Acute right popliteal vein DVT. No evidence of DVT or superficial   thrombophlebitis left lower extremity.    < end of copied text >      PHYSICAL EXAM:    GENERAL: NAD, well-developed, awake and alert  HEENT:  Atraumatic, Normocephalic. Conjunctiva pink and cornea white, No JVD  PULMONARY: Bibasilar crackles, poor respiratory effort; No wheeze  CARDIOVASCULAR: regular rate and rhythm; No murmurs  GASTROINTESTINAL: Soft, Nontender, Nondistended; Bowel sounds present  MUSCULOSKELETAL:  2+ Peripheral Pulses, No petal edema  NEUROLOGY: non-focal  SKIN: No rashes or lesions      ADMISSION SUMMARY  Patient is a 89y old Female who presented with a chief complaint of AMS   Currently admitted to medicine with the primary diagnosis of acute submassive pulmonary embolism      ASSESSMENT & PLAN  89F with PMH of dementia and HTN was brought to ED for syncopal episode    #) Acute unprovoked submassive PE  - Chest CT angio showed acute bilateral main pulmonary artery emboli extending into segmental and subsegmental branches of all pulmonary lobes, with evidence of right heart strain.  - ECHO shows EF 68% with severe pulm HTN and mod-severe TR  - Hemodynamically stable  - Continue Eliquis 10mg  BID for a total of 14 doses (end on 3/1 AM) and then take 5mg BID for lifelong; family aware of the risk of NoAC  - Pulm recs appreciated: can transition to DoAC on discharge, outpatient follow up    #) Acute right popliteal DVT  - Continue Eliquis    #) Syncopal Episode  - Likely from acute pulmonary embolism, NO events on monitor  - Check orthostatic   - Head CT negative for acute infarct  - EEG: generalized slowing, no seizure    #) Hypernatremia - resolved  - Likely from poor oral intake.   - Continue D5 50cc per hour   - BMP monitor daily    #) Dementia  - Stable.    - Continue Exelon patch and Mamantine    #) Deconditioning  - Patient is on 2 person maximum assist, will need Jose lift for transfer from bed to chair. Without Jose lift she will be confound to bed  - See PT eval      DVT ppx: Eliquis  GI ppx: Not indicated  Diet: Dysphagia 3 with thin liquid  Activity: OOBTC  Lines: Peripheral IVs  Code status: Full code  Dispo: d/c planning

## 2019-02-24 LAB
ANION GAP SERPL CALC-SCNC: 9 MMOL/L — SIGNIFICANT CHANGE UP (ref 7–14)
BUN SERPL-MCNC: 24 MG/DL — HIGH (ref 10–20)
CALCIUM SERPL-MCNC: 8.7 MG/DL — SIGNIFICANT CHANGE UP (ref 8.5–10.1)
CHLORIDE SERPL-SCNC: 109 MMOL/L — SIGNIFICANT CHANGE UP (ref 98–110)
CO2 SERPL-SCNC: 30 MMOL/L — SIGNIFICANT CHANGE UP (ref 17–32)
CREAT SERPL-MCNC: 0.8 MG/DL — SIGNIFICANT CHANGE UP (ref 0.7–1.5)
GLUCOSE SERPL-MCNC: 117 MG/DL — HIGH (ref 70–99)
HCT VFR BLD CALC: 33.2 % — LOW (ref 37–47)
HGB BLD-MCNC: 10 G/DL — LOW (ref 12–16)
MAGNESIUM SERPL-MCNC: 2.1 MG/DL — SIGNIFICANT CHANGE UP (ref 1.8–2.4)
MCHC RBC-ENTMCNC: 25.4 PG — LOW (ref 27–31)
MCHC RBC-ENTMCNC: 30.1 G/DL — LOW (ref 32–37)
MCV RBC AUTO: 84.5 FL — SIGNIFICANT CHANGE UP (ref 81–99)
NRBC # BLD: 0 /100 WBCS — SIGNIFICANT CHANGE UP (ref 0–0)
PLATELET # BLD AUTO: 206 K/UL — SIGNIFICANT CHANGE UP (ref 130–400)
POTASSIUM SERPL-MCNC: 4 MMOL/L — SIGNIFICANT CHANGE UP (ref 3.5–5)
POTASSIUM SERPL-SCNC: 4 MMOL/L — SIGNIFICANT CHANGE UP (ref 3.5–5)
RBC # BLD: 3.93 M/UL — LOW (ref 4.2–5.4)
RBC # FLD: 21.9 % — HIGH (ref 11.5–14.5)
SODIUM SERPL-SCNC: 148 MMOL/L — HIGH (ref 135–146)
WBC # BLD: 7.89 K/UL — SIGNIFICANT CHANGE UP (ref 4.8–10.8)
WBC # FLD AUTO: 7.89 K/UL — SIGNIFICANT CHANGE UP (ref 4.8–10.8)

## 2019-02-24 RX ORDER — SODIUM CHLORIDE 9 MG/ML
1000 INJECTION, SOLUTION INTRAVENOUS
Qty: 0 | Refills: 0 | Status: DISCONTINUED | OUTPATIENT
Start: 2019-02-24 | End: 2019-02-26

## 2019-02-24 RX ADMIN — MEMANTINE HYDROCHLORIDE 10 MILLIGRAM(S): 10 TABLET ORAL at 06:50

## 2019-02-24 RX ADMIN — APIXABAN 10 MILLIGRAM(S): 2.5 TABLET, FILM COATED ORAL at 06:50

## 2019-02-24 RX ADMIN — MEMANTINE HYDROCHLORIDE 10 MILLIGRAM(S): 10 TABLET ORAL at 17:35

## 2019-02-24 RX ADMIN — CHLORHEXIDINE GLUCONATE 1 APPLICATION(S): 213 SOLUTION TOPICAL at 12:21

## 2019-02-24 RX ADMIN — APIXABAN 10 MILLIGRAM(S): 2.5 TABLET, FILM COATED ORAL at 17:35

## 2019-02-24 RX ADMIN — SODIUM CHLORIDE 50 MILLILITER(S): 9 INJECTION, SOLUTION INTRAVENOUS at 14:48

## 2019-02-24 NOTE — PROGRESS NOTE ADULT - SUBJECTIVE AND OBJECTIVE BOX
no chest pain   no sob   doing well     Vital Signs Last 24 Hrs  T(C): 35.6 (24 Feb 2019 05:42), Max: 36.7 (23 Feb 2019 20:36)  T(F): 96 (24 Feb 2019 05:42), Max: 98 (23 Feb 2019 20:36)  HR: 73 (24 Feb 2019 05:42) (73 - 73)  BP: 101/52 (24 Feb 2019 05:42) (101/52 - 155/91)  BP(mean): --  RR: 17 (24 Feb 2019 05:42) (17 - 18)  SpO2: 95% (24 Feb 2019 08:33) (88% - 95%)    PHYSICAL EXAM:  GENERAL: NAD, well-developed  HEAD:  Atraumatic, Normocephalic  EYES: EOMI, PERRLA, conjunctiva and sclera clear  NECK: Supple, No JVD  Pulm: Clear to auscultation bilaterally; No wheeze  CV:: Regular rate and rhythm; No murmurs, rubs, or gallops  GI: Soft, Nontender, Nondistended; Bowel sounds present  EXTREMITIES:  2+ Peripheral Pulses, No clubbing, cyanosis, or edema  PSYCH: AAOx3  NEUROLOGY: non-focal                            10.0   7.89  )-----------( 206      ( 24 Feb 2019 06:20 )             33.2     02-24    148<H>  |  109  |  24<H>  ----------------------------<  117<H>  4.0   |  30  |  0.8    Ca    8.7      24 Feb 2019 06:20  Mg     2.1     02-24    TPro  6.1  /  Alb  3.1<L>  /  TBili  0.4  /  DBili  x   /  AST  38  /  ALT  25  /  AlkPhos  62  02-23    LIVER FUNCTIONS - ( 23 Feb 2019 06:32 )  Alb: 3.1 g/dL / Pro: 6.1 g/dL / ALK PHOS: 62 U/L / ALT: 25 U/L / AST: 38 U/L / GGT: x           PT/INR - ( 23 Feb 2019 06:32 )   PT: 17.60 sec;   INR: 1.54 ratio         PTT - ( 23 Feb 2019 06:32 )  PTT:33.0 sec

## 2019-02-24 NOTE — PROGRESS NOTE ADULT - ASSESSMENT
Acute bilateral Pulmonary embolism and LE DVT  Chest CT angio showed Acute bilateral main pulmonary artery emboli extending into segmental and subsegmental branches of all pulmonary lobes, with evidence of right heart strain.  Echo showed severe Pulmonary HTN and moderate to severe TR.   Hemodynamically stabl.e   switch to po eliquis 10 BID for one week then 5 BID   OPT follow up with pulm     Syncopal Episode:   Likely from acute pulmonary embolism,    Hypotension:   Improved.     Hypernatremia: Mild.   d5w 50 cc for 24 hours     Dementia:   stable.      needs home health AID to be set up

## 2019-02-25 RX ADMIN — SODIUM CHLORIDE 50 MILLILITER(S): 9 INJECTION, SOLUTION INTRAVENOUS at 15:35

## 2019-02-25 RX ADMIN — CHLORHEXIDINE GLUCONATE 1 APPLICATION(S): 213 SOLUTION TOPICAL at 05:07

## 2019-02-25 RX ADMIN — MEMANTINE HYDROCHLORIDE 10 MILLIGRAM(S): 10 TABLET ORAL at 05:02

## 2019-02-25 RX ADMIN — MEMANTINE HYDROCHLORIDE 10 MILLIGRAM(S): 10 TABLET ORAL at 17:29

## 2019-02-25 RX ADMIN — APIXABAN 10 MILLIGRAM(S): 2.5 TABLET, FILM COATED ORAL at 05:02

## 2019-02-25 RX ADMIN — APIXABAN 10 MILLIGRAM(S): 2.5 TABLET, FILM COATED ORAL at 17:29

## 2019-02-25 NOTE — DIETITIAN INITIAL EVALUATION ADULT. - ORAL INTAKE PTA
fair/Pt daughter at bedside who reports pt with typically fair intake depending on mood for day. Consumes soft, cut up meals at baseline. Daughter reports poor fluid intake but denies choking episodes with thin liquids. NKFA. Takes daily multivitamin, vitamin c, cholecalciferol, B12 and tumeric supplements.

## 2019-02-25 NOTE — CONSULT NOTE ADULT - ASSESSMENT
89 yo F with Dementia presents with Submassive PE with right heart strain    Chart reviewed and goals of care and management discussed with patient's daughter. 89 yo F with Dementia presents with Acute Submassive PE with right heart strain noted on CT, Severe PHTN and Acute right popliteal DVT    Chart reviewed and goals of care and management discussed with patient's daughter.  Continue conservative management of PE with oral anticoagulation as prescribed by primary team.  Family clearly declines any invasive intervention / management strategies at this time.    Currently, patient is hemodynamically stable and appears comfortable.  Daughter expressed concerns about her mother's respiratory status and possible arrhythmia and requests telemetry monitoring.  Transfer to Telemetry for 24-48 hrs for observation.    Prognosis guarded

## 2019-02-25 NOTE — PHYSICAL THERAPY INITIAL EVALUATION ADULT - IMPAIRMENTS FOUND, PT EVAL
cognitive impairment/gait, locomotion, and balance/ergonomics and body mechanics/posture/decreased midline orientation/muscle strength

## 2019-02-25 NOTE — PHYSICAL THERAPY INITIAL EVALUATION ADULT - LEVEL OF INDEPENDENCE: SIT/STAND, REHAB EVAL
2 trials from EOB. Pt with immediate b/l knee buckling and poor postural control./dependent (less than 25% patients effort)

## 2019-02-25 NOTE — CONSULT NOTE ADULT - SUBJECTIVE AND OBJECTIVE BOX
Chief Complaint:    HPI:    EKG:  CXR:      PAST MEDICAL & SURGICAL HISTORY:  Dementia  History of hip replacement  History of cholecystectomy      SOCIAL HISTORY: Denies EtOH, smoking or drug use    Home Medications:  Exelon 9.5 mg/24 hr transdermal film, extended release: 1 patch transdermal once a day (19 Feb 2019 21:10)  memantine 10 mg oral tablet: 1 tab(s) orally 2 times a day (19 Feb 2019 21:10)      MEDICATIONS  (STANDING):  apixaban 10 milliGRAM(s) Oral every 12 hours  chlorhexidine 4% Liquid 1 Application(s) Topical <User Schedule>  dextrose 5%. 1000 milliLiter(s) (50 mL/Hr) IV Continuous <Continuous>  memantine 10 milliGRAM(s) Oral two times a day      REVIEW OF SYSTEMS:  CONSTITUTIONAL: No fever, weight loss, fatigue  NECK: No pain or stiffness  RESPIRATORY: No cough, wheezing, shortness of breath  CARDIOVASCULAR: See HPI  GASTROINTESTINAL: No abdominal/epigastric pain, nausea, vomiting, hematemesis, diarrhea, constipation, melena or hematochezia  GENITOURINARY: No dysuria, frequency, hematuria, incontinence  NEUROLOGICAL: No headaches, memory loss, loss of strength, numbness, tremors  SKIN: No itching, burning, rashes, lesions   ENDOCRINE: No heat/cold intolerance or hair loss  MUSCULOSKELETAL: No joint pain or swelling  HEME/LYMPH: No easy bruising or bleeding gums    Vital Signs Last 24 Hrs  T(C): 37.4 (25 Feb 2019 13:13), Max: 37.4 (24 Feb 2019 18:50)  T(F): 99.4 (25 Feb 2019 13:13), Max: 99.4 (24 Feb 2019 18:50)  HR: 91 (25 Feb 2019 13:13) (73 - 91)  BP: 118/56 (25 Feb 2019 13:13) (109/68 - 144/67)  BP(mean): --  RR: 18 (25 Feb 2019 13:13) (18 - 95)  SpO2: 97% (25 Feb 2019 13:41) (78% - 97%)    PHYSICAL EXAM:  General: NAD, AAOx3  HEENT: NCAT, EOMI, PERRLA  Neck: supple, no JVD  CV: RRR, S1S2 nl, no murmurs  Respiratory: CTA bilaterally, no wheeze, rales or rhonchi	  Abdomen: soft, NT/ND, +BS  Extremities: ROM nl, no clubbing, cyanosis or edema  Neuro: Nonfocal                            10.0   7.89  )-----------( 206      ( 24 Feb 2019 06:20 )             33.2         02-24    148<H>  |  109  |  24<H>  ----------------------------<  117<H>  4.0   |  30  |  0.8    Ca    8.7      24 Feb 2019 06:20  Mg     2.1     02-24 Chief Complaint:    HPI:  89F with PMHx of Dementia and HTN presents for "freezing"/brief change in mental status. Patient is a poor historian, history per daughter. Since yesterday patient's gait has been altered, appearing "knock kneed" and slower, and appeared slightly weaker than normal, however patient had no other specific complaints or changes. Today patient was walking with walker, when she suddenly froze with eyes open and staring straight ahead. She was grabbed by her daughter and aide and placed in a chair, and was in this state for approximately 5-10 minutes. Additionally, she appeared to only be breathing out of the side of her mouth. When EMS came she was hypotensive to SBP 80s, unknown HR, however her BP improved with no intervention and she returned to baseline mental status.    In ED, afebrile, VSS, Na 151, lactate 4, trop 0.08, BNP ~61491, CXR no effusions or congestion on my read, given 2L LR. EKG showed 1st degree AV block ([place] of 250), CT H showed non specific nika-ventrical hypodensities, DDx include gliosis, demyelination or ischemic changes. UA not yet sent. (19 Feb 2019 21:01)    As per daughter at bedside patient had a fall in January after which she stayed in bed for around 2 weeks. After that she started walking with a walker but short distances. Patient needs help with all ADLs.    EKG:   CXR: Clear      PAST MEDICAL & SURGICAL HISTORY:  Dementia  History of hip replacement  History of cholecystectomy      SOCIAL HISTORY: Denies EtOH, smoking or drug use    Allergy: Codeine      Home Medications:  Exelon 9.5 mg/24 hr transdermal film, extended release: 1 patch transdermal once a day (19 Feb 2019 21:10)  memantine 10 mg oral tablet: 1 tab(s) orally 2 times a day (19 Feb 2019 21:10)      MEDICATIONS  (STANDING):  apixaban 10 milliGRAM(s) Oral every 12 hours  chlorhexidine 4% Liquid 1 Application(s) Topical <User Schedule>  dextrose 5%. 1000 milliLiter(s) (50 mL/Hr) IV Continuous <Continuous>  memantine 10 milliGRAM(s) Oral two times a day      REVIEW OF SYSTEMS:  CONSTITUTIONAL: No fever, weight loss, fatigue  NECK: No pain or stiffness  RESPIRATORY: See HPI  CARDIOVASCULAR: See HPI  GASTROINTESTINAL: No abdominal/epigastric pain, nausea, vomiting, hematemesis, diarrhea, constipation, melena or hematochezia  GENITOURINARY: No dysuria, frequency, hematuria, incontinence  NEUROLOGICAL: No headaches, memory loss, loss of strength, numbness, tremors  SKIN: No itching, burning, rashes, lesions   ENDOCRINE: No heat/cold intolerance or hair loss  MUSCULOSKELETAL: No joint pain or swelling  HEME/LYMPH: No easy bruising or bleeding gums    Vital Signs Last 24 Hrs  T(C): 37.4 (25 Feb 2019 13:13), Max: 37.4 (24 Feb 2019 18:50)  T(F): 99.4 (25 Feb 2019 13:13), Max: 99.4 (24 Feb 2019 18:50)  HR: 91 (25 Feb 2019 13:13) (73 - 91)  BP: 118/56 (25 Feb 2019 13:13) (109/68 - 144/67)  BP(mean): --  RR: 18 (25 Feb 2019 13:13) (18 - 95)  SpO2: 97% (25 Feb 2019 13:41) (78% - 97%)    PHYSICAL EXAM:  General: NAD, AAOx3  HEENT: NCAT, EOMI, PERRLA  Neck: supple, no JVD  CV: RRR, S1S2 nl, no murmurs  Respiratory: CTA bilaterally, no wheeze, rales or rhonchi	  Abdomen: soft, NT/ND, +BS  Extremities: ROM nl, no clubbing, cyanosis or edema  Neuro: Nonfocal                            10.0   7.89  )-----------( 206      ( 24 Feb 2019 06:20 )             33.2         02-24    148<H>  |  109  |  24<H>  ----------------------------<  117<H>  4.0   |  30  |  0.8    Ca    8.7      24 Feb 2019 06:20  Mg     2.1     02-24        < from: CT Angio Chest w/ IV Cont (02.20.19 @ 23:49) >  IMPRESSION:        1.  Acute bilateral main pulmonary artery emboli extending into segmental   and subsegmental branches of all pulmonary lobes, with evidence of right   heart strain.    < end of copied text >        < from: Transthoracic Echocardiogram (02.20.19 @ 09:05) >  Summary:   1. LV Ejection Fraction by Aparicio's Method with a biplane EF of 68 %.   2. Moderately increased LV wall thickness.   3. Spectral Doppler shows impaired relaxation pattern of left   ventricular myocardial filling (Grade I diastolic dysfunction).   4. Thickening and calcification of the anterior and posterior mitral   valve leaflets.   5. Moderate-severe tricuspid regurgitation.   6. Mild aortic regurgitation.   7. Pulmonic valve regurgitation.   8. Estimated pulmonary artery systolic pressure is 67.9 mmHg assuming a   right atrial pressure of 15 mmHg, which is consistent with severe   pulmonary hypertension.    < end of copied text > Chief Complaint: AMS and generalized weakness    HPI:  89F with PMHx of Dementia and HTN presents for "freezing"/brief change in mental status. Patient is a poor historian, history per daughter. Since yesterday patient's gait has been altered, appearing "knock kneed" and slower, and appeared slightly weaker than normal, however patient had no other specific complaints or changes. Today patient was walking with walker, when she suddenly froze with eyes open and staring straight ahead. She was grabbed by her daughter and aide and placed in a chair, and was in this state for approximately 5-10 minutes. Additionally, she appeared to only be breathing out of the side of her mouth. When EMS came she was hypotensive to SBP 80s, unknown HR, however her BP improved with no intervention and she returned to baseline mental status.    In ED, afebrile, VSS, Na 151, lactate 4, trop 0.08, BNP ~12547, CXR no effusions or congestion on my read, given 2L LR. EKG showed 1st degree AV block ([place] of 250), CT H showed non specific nika-ventrical hypodensities, DDx include gliosis, demyelination or ischemic changes. UA not yet sent. (19 Feb 2019 21:01)    As per daughter at bedside patient had a fall in January after which she stayed in bed for around 2 weeks. After that she started walking with a walker but short distances. Patient needs help with all ADLs.    EKG:  bpm  CXR: Clear      PAST MEDICAL & SURGICAL HISTORY:  Dementia  History of hip replacement  History of cholecystectomy      SOCIAL HISTORY: Denies EtOH, smoking or drug use    Allergy: Codeine      Home Medications:  Exelon 9.5 mg/24 hr transdermal film, extended release: 1 patch transdermal once a day (19 Feb 2019 21:10)  memantine 10 mg oral tablet: 1 tab(s) orally 2 times a day (19 Feb 2019 21:10)      MEDICATIONS  (STANDING):  apixaban 10 milliGRAM(s) Oral every 12 hours  chlorhexidine 4% Liquid 1 Application(s) Topical <User Schedule>  dextrose 5%. 1000 milliLiter(s) (50 mL/Hr) IV Continuous <Continuous>  memantine 10 milliGRAM(s) Oral two times a day      REVIEW OF SYSTEMS:  CONSTITUTIONAL: No fever, weight loss, fatigue  NECK: No pain or stiffness  RESPIRATORY: See HPI  CARDIOVASCULAR: See HPI  GASTROINTESTINAL: No abdominal/epigastric pain, nausea, vomiting, hematemesis, diarrhea, constipation, melena or hematochezia  GENITOURINARY: No dysuria, frequency, hematuria, incontinence  NEUROLOGICAL: No headaches, memory loss, loss of strength, numbness, tremors  SKIN: No itching, burning, rashes, lesions   ENDOCRINE: No heat/cold intolerance or hair loss  MUSCULOSKELETAL: No joint pain or swelling  HEME/LYMPH: No easy bruising or bleeding gums    Vital Signs Last 24 Hrs  T(C): 37.4 (25 Feb 2019 13:13), Max: 37.4 (24 Feb 2019 18:50)  T(F): 99.4 (25 Feb 2019 13:13), Max: 99.4 (24 Feb 2019 18:50)  HR: 91 (25 Feb 2019 13:13) (73 - 91)  BP: 118/56 (25 Feb 2019 13:13) (109/68 - 144/67)  BP(mean): --  RR: 18 (25 Feb 2019 13:13) (18 - 95)  SpO2: 97% (25 Feb 2019 13:41) (78% - 97%)    PHYSICAL EXAM:  General: NAD, AAOx3  HEENT: NCAT  Neck: supple, no JVD  CV: irregular, no murmurs  Respiratory: CTA bilaterally, no wheeze, rales or rhonchi	  Abdomen: soft, NT/ND, +BS  Extremities: warm, no clubbing, cyanosis or edema  Neuro: Nonfocal                            10.0   7.89  )-----------( 206      ( 24 Feb 2019 06:20 )             33.2         02-24    148<H>  |  109  |  24<H>  ----------------------------<  117<H>  4.0   |  30  |  0.8    Ca    8.7      24 Feb 2019 06:20  Mg     2.1     02-24        < from: CT Angio Chest w/ IV Cont (02.20.19 @ 23:49) >  IMPRESSION:        1.  Acute bilateral main pulmonary artery emboli extending into segmental   and subsegmental branches of all pulmonary lobes, with evidence of right   heart strain.    < end of copied text >        < from: Transthoracic Echocardiogram (02.20.19 @ 09:05) >  Summary:   1. LV Ejection Fraction by Aparicio's Method with a biplane EF of 68 %.   2. Moderately increased LV wall thickness.   3. Spectral Doppler shows impaired relaxation pattern of left   ventricular myocardial filling (Grade I diastolic dysfunction).   4. Thickening and calcification of the anterior and posterior mitral   valve leaflets.   5. Moderate-severe tricuspid regurgitation.   6. Mild aortic regurgitation.   7. Pulmonic valve regurgitation.   8. Estimated pulmonary artery systolic pressure is 67.9 mmHg assuming a   right atrial pressure of 15 mmHg, which is consistent with severe   pulmonary hypertension.    < end of copied text > Chief Complaint: AMS and generalized weakness    HPI:  89F with PMHx of Dementia and HTN presents for "freezing"/brief change in mental status. Patient is a poor historian, history per daughter. Since yesterday patient's gait has been altered, appearing "knock kneed" and slower, and appeared slightly weaker than normal, however patient had no other specific complaints or changes. Today patient was walking with walker, when she suddenly froze with eyes open and staring straight ahead. She was grabbed by her daughter and aide and placed in a chair, and was in this state for approximately 5-10 minutes. Additionally, she appeared to only be breathing out of the side of her mouth. When EMS came she was hypotensive to SBP 80s, unknown HR, however her BP improved with no intervention and she returned to baseline mental status.    In ED, afebrile, VSS, Na 151, lactate 4, trop 0.08, BNP ~68571, CXR no effusions or congestion on my read, given 2L LR. EKG showed 1st degree AV block ([place] of 250), CT H showed non specific nika-ventrical hypodensities, DDx include gliosis, demyelination or ischemic changes. UA not yet sent. (19 Feb 2019 21:01)    As per daughter at bedside patient had a fall in January after which she stayed in bed for around 2 weeks. After that she started walking with a walker but short distances. Patient needs help with all ADLs.    EKG 2/19: SR with 1AVB  EKG 2/25:  bpm    CXR: Clear      PAST MEDICAL & SURGICAL HISTORY:  Dementia  History of hip replacement  History of cholecystectomy      SOCIAL HISTORY: Denies EtOH, smoking or drug use    Allergy: Codeine      Home Medications:  Exelon 9.5 mg/24 hr transdermal film, extended release: 1 patch transdermal once a day (19 Feb 2019 21:10)  memantine 10 mg oral tablet: 1 tab(s) orally 2 times a day (19 Feb 2019 21:10)      MEDICATIONS  (STANDING):  apixaban 10 milliGRAM(s) Oral every 12 hours  chlorhexidine 4% Liquid 1 Application(s) Topical <User Schedule>  dextrose 5%. 1000 milliLiter(s) (50 mL/Hr) IV Continuous <Continuous>  memantine 10 milliGRAM(s) Oral two times a day      REVIEW OF SYSTEMS:  CONSTITUTIONAL: No fever, weight loss, fatigue  NECK: No pain or stiffness  RESPIRATORY: See HPI  CARDIOVASCULAR: See HPI  GASTROINTESTINAL: No abdominal/epigastric pain, nausea, vomiting, hematemesis, diarrhea, constipation, melena or hematochezia  GENITOURINARY: No dysuria, frequency, hematuria, incontinence  NEUROLOGICAL: No headaches, memory loss, loss of strength, numbness, tremors  SKIN: No itching, burning, rashes, lesions   ENDOCRINE: No heat/cold intolerance or hair loss  MUSCULOSKELETAL: No joint pain or swelling  HEME/LYMPH: No easy bruising or bleeding gums    Vital Signs Last 24 Hrs  T(C): 37.4 (25 Feb 2019 13:13), Max: 37.4 (24 Feb 2019 18:50)  T(F): 99.4 (25 Feb 2019 13:13), Max: 99.4 (24 Feb 2019 18:50)  HR: 91 (25 Feb 2019 13:13) (73 - 91)  BP: 118/56 (25 Feb 2019 13:13) (109/68 - 144/67)  BP(mean): --  RR: 18 (25 Feb 2019 13:13) (18 - 95)  SpO2: 97% (25 Feb 2019 13:41) (78% - 97%)    PHYSICAL EXAM:  General: NAD, AAOx3  HEENT: NCAT  Neck: supple, no JVD  CV: irregular, no murmurs  Respiratory: CTA bilaterally, no wheeze, rales or rhonchi	  Abdomen: soft, NT/ND, +BS  Extremities: warm, no clubbing, cyanosis or edema  Neuro: Nonfocal                            10.0   7.89  )-----------( 206      ( 24 Feb 2019 06:20 )             33.2         02-24    148<H>  |  109  |  24<H>  ----------------------------<  117<H>  4.0   |  30  |  0.8    Ca    8.7      24 Feb 2019 06:20  Mg     2.1     02-24        < from: CT Angio Chest w/ IV Cont (02.20.19 @ 23:49) >  IMPRESSION:        1.  Acute bilateral main pulmonary artery emboli extending into segmental   and subsegmental branches of all pulmonary lobes, with evidence of right   heart strain.    < end of copied text >        < from: Transthoracic Echocardiogram (02.20.19 @ 09:05) >  Summary:   1. LV Ejection Fraction by Aparicio's Method with a biplane EF of 68 %.   2. Moderately increased LV wall thickness.   3. Spectral Doppler shows impaired relaxation pattern of left   ventricular myocardial filling (Grade I diastolic dysfunction).   4. Thickening and calcification of the anterior and posterior mitral   valve leaflets.   5. Moderate-severe tricuspid regurgitation.   6. Mild aortic regurgitation.   7. Pulmonic valve regurgitation.   8. Estimated pulmonary artery systolic pressure is 67.9 mmHg assuming a   right atrial pressure of 15 mmHg, which is consistent with severe   pulmonary hypertension.    < end of copied text >

## 2019-02-25 NOTE — PHYSICAL THERAPY INITIAL EVALUATION ADULT - TRANSFER SAFETY CONCERNS NOTED: BED/CHAIR, REHAB EVAL
decreased weight-shifting ability/losing balance/decreased safety awareness decreased safety awareness/losing balance/decreased weight-shifting ability/Recommend shaina lift transfer at this time.

## 2019-02-25 NOTE — DIETITIAN INITIAL EVALUATION ADULT. - DIET TYPE
DASH/TLC (sodium and cholesterol restricted diet)/Observed pt daughter feeding pt lunch with 50% intake of oatmeal and still eating pancakes, dislikes eggs. Daughter reports pt with typically 50-60% PO intake but poor fluid intake remains. Daughter requesting nutrition supplement at this time. See RD recommendations./dysphagia 3, soft, thin liquids

## 2019-02-25 NOTE — PHYSICAL THERAPY INITIAL EVALUATION ADULT - PERTINENT HX OF CURRENT PROBLEM, REHAB EVAL
89F with PMHx of dementia and HTN presents for "freezing"/brief change in mental status. Patient is a poor historian, history per daughter. Since yesterday patient's gait has been altered, appearing "knock kneed" and slower, and appeared slightly weaker than normal

## 2019-02-25 NOTE — PROGRESS NOTE ADULT - ATTENDING COMMENTS
Pt was seen and examined at bedside independently, pt is comfortable and denies any specific complaints, daughter is by the bedside.   She expressed her concerns regarding pts condition and diagnosis of acute PE. I explained  all findings and treatment plan.  I  spoke with daughter about pts code status, she wants her mother to be a full code, daughter requested cardiology consult regardless of all appropriate treatment and options which were discussed with family by pulmonary and medical teams.   Case d/w cardiology attending, she recommended conservative management for now and telemetry monitoring for 24 hours.   I agree with residents findings, assessment and plan above, most likely pt will require home oxygen ( I spoke with case manger regarding this issue).   Pt was started on po anticoagulation, she is hemodynamically stable at this point with right heart strain due to acute PE with PHTN, prognosis guarded.   Family requested official 2Decho report which was provided to pts daughter  and asked to contact the family member who is a physician Dr Servando Minaya cell [phone], fax [phone].   Dispo: transfer to telemetry for observation and plan discharge in 24-48 hours. Pt was seen and examined at bedside independently, pt is comfortable and denies any specific complaints, daughter is by the bedside.   She expressed her concerns regarding pts condition and diagnosis of acute PE. I explained  all findings and treatment plan.  I  spoke with daughter about pts code status, she wants her mother to be a full code, daughter requested cardiology consult regardless of all appropriate treatment and options which were discussed with family by pulmonary and medical teams.   Case d/w cardiology attending, she recommended conservative management for now and telemetry monitoring for 24 hours.   I agree with residents findings, assessment and plan above, most likely pt will require home oxygen ( I spoke with case manger regarding this issue).   Pt was started on po anticoagulation, she is hemodynamically stable at this point with right heart strain due to acute PE with PHTN, prognosis guarded.   Family requested official 2Decho report which was provided to pts daughter  and asked to contact the family member who is a physician Dr Servando Minaya cell [phone], fax [phone].   Dispo: transfer to telemetry for observation and plan discharge in 24-48 hours.    #Progress Note Handoff  Pending (specify):  Consults_________, Tests________, Test Results_______, Other___telemtry monitoring for 24 hours ______  Family discussion: I spoke with daughter by the bedside   Disposition: Home__x _/SNF___/Other________/Unknown at this time________

## 2019-02-25 NOTE — CHART NOTE - NSCHARTNOTEFT_GEN_A_CORE
Upgrade to tele by patient and cardiology request - see cardiology note on same day.    89F with PMHx of dementia and HTN presents for "freezing"/brief change in mental status. Patient is a poor historian, history per daughter. Since yesterday patient's gait has been altered, appearing "knock kneed" and slower, and appeared slightly weaker than normal, however patient had no other specific complaints or changes. Today patient was walking with walker, when she suddenly froze with eyes open and staring straight ahead. She was grabbed by her daughter and aide and placed in a chair, and was in this state for approximately 5-10 minutes. Additionally, she appeared to only be breathing out of the side of her mouth. When EMS came she was hypotensive to SBP 80s, unknown HR, however her BP improved with no intervention and she returned to baseline mental status.  In ED, afebrile, VSS, Na 151, lactate 4, trop 0.08, BNP ~26952, CXR no effusions or congestion on my read, given 2L LR. EKG showed 1st degree AV block ([place] of 250), CT H showed non specific nika-ventrical hypodensities.    #) Acute unprovoked submassive PE  - Chest CT angio showed acute bilateral main pulmonary artery emboli extending into segmental and subsegmental branches of all pulmonary lobes, with evidence of right heart strain.  - ECHO shows EF 68% with severe pulm HTN and mod-severe TR  - Hemodynamically stable  - Continue Eliquis 10mg  BID for a total of 14 doses (end on 3/1 AM) and then take 5mg BID for lifelong; family aware of the risk of NOAC  - Pulm recs appreciated: can transition to DOAC on discharge, outpatient follow up  - f/u card eval    #) Acute right popliteal DVT  - Continue Eliquis    #) Syncopal Episode  - Likely from acute pulmonary embolism, NO events on monitor  - Check orthostatic   - Head CT negative for acute infarct  - EEG: generalized slowing, no seizure    #) Hypernatremia  - Likely from poor oral intake.   - Continue D5 50cc per hour   - BMP monitor daily    #) Dementia  - Stable.    - Continue Exelon patch and Memantine    #) Deconditioning  - Patient is on 2 person maximum assist, will need Jose lift for transfer from bed to chair. Without Jose lift she will be confound to bed  - See PT eval    DVT ppx: Eliquis  GI ppx: Not indicated  Diet: Dysphagia 3 with thin liquid  Activity: OOBTC  Code status: Full code  Dispo: d/c planning

## 2019-02-25 NOTE — DIETITIAN INITIAL EVALUATION ADULT. - ENERGY NEEDS
estimated calorie needs = 3199-6939 kcal/day (MSJ x 1.1-1.2 considering advanced age and limited physical activity).  estimated protein needs = 69-83 g/day (1.0-1.2 g/kg CBW).   estimated fluid needs = 1mL/kcal or per LIP

## 2019-02-25 NOTE — PROGRESS NOTE ADULT - ASSESSMENT
89F with PMH of dementia and HTN was brought to ED for syncopal episode    #) Acute unprovoked submassive PE  - Chest CT angio showed acute bilateral main pulmonary artery emboli extending into segmental and subsegmental branches of all pulmonary lobes, with evidence of right heart strain.  - ECHO shows EF 68% with severe pulm HTN and mod-severe TR  - Hemodynamically stable  - Continue Eliquis 10mg  BID for a total of 14 doses (end on 3/1 AM) and then take 5mg BID for lifelong; family aware of the risk of NOAC  - Pulm recs appreciated: can transition to DOAC on discharge, outpatient follow up  - f/u card eval    #) Acute right popliteal DVT  - Continue Eliquis    #) Syncopal Episode  - Likely from acute pulmonary embolism, NO events on monitor  - Check orthostatic   - Head CT negative for acute infarct  - EEG: generalized slowing, no seizure    #) Hypernatremia  - Likely from poor oral intake.   - Continue D5 50cc per hour   - BMP monitor daily    #) Dementia  - Stable.    - Continue Exelon patch and Memantine    #) Deconditioning  - Patient is on 2 person maximum assist, will need Jose lift for transfer from bed to chair. Without Jose lift she will be confound to bed  - See PT eval    DVT ppx: Eliquis  GI ppx: Not indicated  Diet: Dysphagia 3 with thin liquid  Activity: OOBTC  Code status: Full code  Dispo: d/c planning

## 2019-02-25 NOTE — PHYSICAL THERAPY INITIAL EVALUATION ADULT - GENERAL OBSERVATIONS, REHAB EVAL
1110  1208 Pt rec semifowler in bed in NAD with daughter at b/s. Pt appears confused,requires constant PT max encouragement to participate as pt kept on requesting to lay back down. Pt keeps stating "you're hurting me!" upon even gentle touch to any extremity. 1110  1202 Pt rec semifowler in bed in NAD with daughter at b/s. Pt appears confused,requires constant PT max encouragement to participate as pt kept on requesting to lay back down. Pt keeps stating "you're hurting me!" upon even gentle touch to any extremity. Recommend shaina lift transfer back to bed when appropriate at this time.

## 2019-02-25 NOTE — DIETITIAN INITIAL EVALUATION ADULT. - OTHER INFO
Pt p/w altered gait and "freezing episode". Primary dx: elevated troponin and lactic acid and syncope. Hospital course complicated by acute b/l Pulmonary embolism and LE DVT. Syncopal episode likely 2/2 acute pulmonary embolism. Hypotension, improved. Mild hypernatremia, improved. hx of Dementia. Reason for assessment: poor PO intake reported on RD check.

## 2019-02-25 NOTE — PHYSICAL THERAPY INITIAL EVALUATION ADULT - LEVEL OF INDEPENDENCE: SUPINE/SIT, REHAB EVAL
dependent (less than 25% patients effort)/Pt losing balance seated at EOB requiring PT max assist to maintain sitting.

## 2019-02-25 NOTE — PHYSICAL THERAPY INITIAL EVALUATION ADULT - LEVEL OF INDEPENDENCE: GAIT, REHAB EVAL
unable to perform/Unable to safely advance at this time due to poor ability to maintain standing/sitting position.

## 2019-02-25 NOTE — PHYSICAL THERAPY INITIAL EVALUATION ADULT - LEVEL OF INDEPENDENCE: BED TO CHAIR, REHAB EVAL
dependent (less than 25% patients effort)/Standing pivot transfer however no meaningful effort provided by pt during transfer.

## 2019-02-25 NOTE — PROGRESS NOTE ADULT - SUBJECTIVE AND OBJECTIVE BOX
CHIEF COMPLAINT:    Patient is a 89y old Female who presents with a chief complaint of AMS (24 Feb 2019 10:32)    Currently admitted to medicine with the primary diagnosis of Other acute pulmonary embolism with acute cor pulmonale     Today is hospital day 6d. This morning she is resting comfortably in bed and reports no new issues or overnight events.     PAST MEDICAL & SURGICAL HISTORY  Dementia  History of hip replacement  History of cholecystectomy      ALLERGIES:  No Known Allergies    MEDICATIONS:  STANDING MEDICATIONS  apixaban 10 milliGRAM(s) Oral every 12 hours  chlorhexidine 4% Liquid 1 Application(s) Topical <User Schedule>  dextrose 5%. 1000 milliLiter(s) IV Continuous <Continuous>  dextrose 5%. 1000 milliLiter(s) IV Continuous <Continuous>  dextrose 5%. 1000 milliLiter(s) IV Continuous <Continuous>  memantine 10 milliGRAM(s) Oral two times a day    PRN MEDICATIONS    VITALS:   T(F): 99.4  HR: 91  BP: 118/56  RR: 18  SpO2: 97%    LABS:                        10.0   7.89  )-----------( 206      ( 24 Feb 2019 06:20 )             33.2     02-24    148<H>  |  109  |  24<H>  ----------------------------<  117<H>  4.0   |  30  |  0.8    Ca    8.7      24 Feb 2019 06:20  Mg     2.1     02-24    RADIOLOGY:  < from: CT Head No Cont (02.19.19 @ 15:49) >  1.  No acute intracranial hemorrhage.    2.  Chronic right temporal lobe infarct.    3.  Periventricular white matter hypodensities, nonspecific, differential   diagnostic possibilities include ischemic change, gliosis or   demyelination.    < end of copied text >    < from: CT Angio Chest w/ IV Cont (02.20.19 @ 23:49) >  Acute bilateral main pulmonary artery emboli extending into segmental and subsegmental branches of all pulmonary lobes, with evidence of right heart strain.  < end of copied text >    < from: Transthoracic Echocardiogram (02.20.19 @ 09:05) >   1. LV Ejection Fraction by Aparicio's Method with a biplane EF of 68 %.   2. Moderately increased LV wall thickness.   3. Spectral Doppler shows impaired relaxation pattern of left ventricular myocardial filling (Grade I diastolic dysfunction).   4. Thickening and calcification of the anterior and posterior mitral valve leaflets.   5. Moderate-severe tricuspid regurgitation.   6. Mild aortic regurgitation.   7. Pulmonic valve regurgitation.   8. Estimated pulmonary artery systolic pressure is 67.9 mmHg assuming a right atrial pressure of 15 mmHg, which is consistent with severe pulmonary hypertension.  < end of copied text >    < from: VA Duplex Lower Ext Vein Scan, Bilat (02.21.19 @ 13:41) >  Acute right popliteal vein DVT. No evidence of DVT or superficial thrombophlebitis left lower extremity.  < end of copied text >    PHYSICAL EXAM:  GEN: No acute distress  PULM: Clear to auscultation bilaterally   CARD: S1/S2 present. RRR.   GI: Soft, non-tender, non-distended. Bowel sounds present  MSK: NC/NC/NE/2+PP/BUTLER  NEURO: AAOX3

## 2019-02-25 NOTE — DIETITIAN INITIAL EVALUATION ADULT. - FACTORS AFF FOOD INTAKE
difficulty chewing/difficulty swallowing/SLP 2/22: dysphagia III, thin liquids. denies n/v. fecal incontinence noted./difficulty feeding self

## 2019-02-26 VITALS
HEART RATE: 77 BPM | SYSTOLIC BLOOD PRESSURE: 130 MMHG | TEMPERATURE: 98 F | RESPIRATION RATE: 18 BRPM | DIASTOLIC BLOOD PRESSURE: 60 MMHG

## 2019-02-26 LAB
ANION GAP SERPL CALC-SCNC: 9 MMOL/L — SIGNIFICANT CHANGE UP (ref 7–14)
BUN SERPL-MCNC: 19 MG/DL — SIGNIFICANT CHANGE UP (ref 10–20)
CALCIUM SERPL-MCNC: 8.3 MG/DL — LOW (ref 8.5–10.1)
CHLORIDE SERPL-SCNC: 105 MMOL/L — SIGNIFICANT CHANGE UP (ref 98–110)
CO2 SERPL-SCNC: 31 MMOL/L — SIGNIFICANT CHANGE UP (ref 17–32)
CREAT SERPL-MCNC: 0.6 MG/DL — LOW (ref 0.7–1.5)
GLUCOSE SERPL-MCNC: 107 MG/DL — HIGH (ref 70–99)
HCT VFR BLD CALC: 30.8 % — LOW (ref 37–47)
HGB BLD-MCNC: 9.1 G/DL — LOW (ref 12–16)
MAGNESIUM SERPL-MCNC: 2 MG/DL — SIGNIFICANT CHANGE UP (ref 1.8–2.4)
MCHC RBC-ENTMCNC: 25.2 PG — LOW (ref 27–31)
MCHC RBC-ENTMCNC: 29.5 G/DL — LOW (ref 32–37)
MCV RBC AUTO: 85.3 FL — SIGNIFICANT CHANGE UP (ref 81–99)
NRBC # BLD: 0 /100 WBCS — SIGNIFICANT CHANGE UP (ref 0–0)
PLATELET # BLD AUTO: 191 K/UL — SIGNIFICANT CHANGE UP (ref 130–400)
POTASSIUM SERPL-MCNC: 4.1 MMOL/L — SIGNIFICANT CHANGE UP (ref 3.5–5)
POTASSIUM SERPL-SCNC: 4.1 MMOL/L — SIGNIFICANT CHANGE UP (ref 3.5–5)
RBC # BLD: 3.61 M/UL — LOW (ref 4.2–5.4)
RBC # FLD: 20.8 % — HIGH (ref 11.5–14.5)
SODIUM SERPL-SCNC: 145 MMOL/L — SIGNIFICANT CHANGE UP (ref 135–146)
WBC # BLD: 8.84 K/UL — SIGNIFICANT CHANGE UP (ref 4.8–10.8)
WBC # FLD AUTO: 8.84 K/UL — SIGNIFICANT CHANGE UP (ref 4.8–10.8)

## 2019-02-26 RX ADMIN — SODIUM CHLORIDE 50 MILLILITER(S): 9 INJECTION, SOLUTION INTRAVENOUS at 11:49

## 2019-02-26 RX ADMIN — MEMANTINE HYDROCHLORIDE 10 MILLIGRAM(S): 10 TABLET ORAL at 17:49

## 2019-02-26 RX ADMIN — MEMANTINE HYDROCHLORIDE 10 MILLIGRAM(S): 10 TABLET ORAL at 06:24

## 2019-02-26 RX ADMIN — CHLORHEXIDINE GLUCONATE 1 APPLICATION(S): 213 SOLUTION TOPICAL at 07:09

## 2019-02-26 RX ADMIN — APIXABAN 10 MILLIGRAM(S): 2.5 TABLET, FILM COATED ORAL at 17:49

## 2019-02-26 RX ADMIN — APIXABAN 10 MILLIGRAM(S): 2.5 TABLET, FILM COATED ORAL at 06:24

## 2019-02-26 NOTE — PROGRESS NOTE ADULT - ASSESSMENT
YURI GOLD   89y   Female    MRN#: [id number]         SUBJECTIVE  Patient is a 89y old Female who presents with a chief complaint of AMS (25 Feb 2019 14:07)  Currently admitted to medicine with the primary diagnosis of Other acute pulmonary embolism with acute cor pulmonale  Patient admitted for acute submassive PE and was started on therapeutic Lovenox later switched to Apixaban. The patient was deemed a poor candidate for thrombectomy and catheter thrombolysis. The patient was monitored on Telemetry and a 2D echo done showed normal EF and pulmonary hypertension. The patient needed help to get out of bed and was found to be desaturating on room air upon activity qualifying her for Home O2. The patient was also found to be hypernatremic on admission and was treated with D5W with improvement. A duplex US was done and showed an acute right popliteal DVT. As a workup for syncopal episode the patient a CT scan of the head that was negative for stroke and an EEG that showed generalized slowing   Today is hospital day 7d, and this morning she is somnolent but can be aroused and follows commands. The daughter states that the patient hasn't slept well during the night. No major events happened overnight     Present Today:           Ferrera Catheter X          Central Line X          IV Fluids X          Drains X      OBJECTIVE  ------------------------------------    PAST MEDICAL & SURGICAL HISTORY  Dementia  History of hip replacement  History of cholecystectomy      ALLERGIES:  codeine (Short breath; Swelling)  shellfish (Short breath; Swelling)      MEDICATIONS:  STANDING MEDICATIONS  apixaban 10 milliGRAM(s) Oral every 12 hours  chlorhexidine 4% Liquid 1 Application(s) Topical <User Schedule>  dextrose 5%. 1000 milliLiter(s) IV Continuous <Continuous>  memantine 10 milliGRAM(s) Oral two times a day    PRN MEDICATIONS        LABS:                        9.1    8.84  )-----------( 191      ( 26 Feb 2019 05:47 )             30.8     02-26    145  |  105  |  19  ----------------------------<  107<H>  4.1   |  31  |  0.6<L>    Ca    8.3<L>      26 Feb 2019 05:47  Mg     2.0     02-26      RADIOLOGY:    < from: CT Angio Chest w/ IV Cont (02.20.19 @ 23:49) >  1.  Acute bilateral main pulmonary artery emboli extending into segmental   and subsegmental branches of all pulmonary lobes, with evidence of right   heart strain.    < from: Transthoracic Echocardiogram (02.20.19 @ 09:05) >   1. LV Ejection Fraction by Aparicio's Method with a biplane EF of 68 %.   2. Moderately increased LV wall thickness.   3. Spectral Doppler shows impaired relaxation pattern of left   ventricular myocardial filling (Grade I diastolic dysfunction).   4. Thickening and calcification of the anterior and posterior mitral   valve leaflets.   5. Moderate-severe tricuspid regurgitation.   6. Mild aortic regurgitation.   7. Pulmonic valve regurgitation.   8. Estimated pulmonary artery systolic pressure is 67.9 mmHg assuming a   right atrial pressure of 15 mmHg, which is consistent with severe   pulmonary hypertension.        PHYSICAL EXAM:  VITAL SIGNS: Last 24 Hours  T(C): 36.1 (26 Feb 2019 05:48), Max: 37.6 (25 Feb 2019 19:51)  T(F): 97 (26 Feb 2019 05:48), Max: 99.6 (25 Feb 2019 19:51)  HR: 67 (26 Feb 2019 05:48) (67 - 95)  BP: 123/57 (26 Feb 2019 05:48) (118/56 - 152/62)  BP(mean): --  RR: 18 (26 Feb 2019 05:48) (18 - 18)  SpO2: 88% (26 Feb 2019 09:29) (78% - 98%)    GENERAL: No distress, somnolent but opens eyes and moans when being called   PULMONARY: Suboptimal exam as patient can lean forward and take deep breaths but decreased breath sounds at the bases   CARDIOVASCULAR: Irregular S1S2   GASTROINTESTINAL: Soft, Nontender, Nondistended   MUSCULOSKELETAL:  + Peripheral Pulses  NEUROLOGY: non-focal, follows commands       ASSESSMENT & PLAN    #) Acute unprovoked submassive PE  - Chest CT angio showed acute bilateral main pulmonary artery emboli extending into segmental and subsegmental branches of all pulmonary lobes, with evidence of right heart strain.  - ECHO shows EF 68% with severe pulm HTN and mod-severe TR  - Hemodynamically stable  - Continue Eliquis 10mg  BID for a total of 14 doses (end on 3/1 AM) and then take 5mg BID for lifelong; family aware of the risk of NOAC  Patient's O2 sat is 93 % on room air at rest, O2 sat drops to 84 % at room air on activity and then improves to 97 % on 3 LPM Nasal O2 with activity. Patient  has bilateral submassive pulmonary embolism  and was tested in a stable chronic state.    #) Acute right popliteal DVT  - Continue Eliquis    #) Syncopal Episode  - Likely from acute pulmonary embolism, NO events on monitor  - Head CT negative for acute infarct  - EEG: generalized slowing, no seizure    #) Hypernatremia  - Resolved s/p D5W IVF - Na 145 today     #) Dementia  - Stable.    - Continue Exelon patch and Memantine    #) Deconditioning  - Patient is on 2 person maximum assist, will need Jose lift for transfer from bed to chair. Without Jose lift she will be confound to bed  - See PT eval    # DVT prophylaxis : Eliquis   # GI prophylaxis : X  # Activity : Ambulate with assistance   # Diet : Dysphagia III soft thin liquids   # Code : FULL  # Disposition : Home with home care YURI GOLD   89y   Female    MRN#: [id number]         SUBJECTIVE  Patient is a 89y old Female who presents with a chief complaint of AMS (25 Feb 2019 14:07)  Currently admitted to medicine with the primary diagnosis of Other acute pulmonary embolism with acute cor pulmonale.   Patient admitted for acute submassive PE and was started on therapeutic Lovenox later switched to Apixaban. The patient was deemed a poor candidate for thrombectomy and catheter thrombolysis. The patient was monitored on Telemetry and a 2D echo done showed normal EF and pulmonary hypertension. The patient needed help to get out of bed and was found to be desaturating on room air upon activity qualifying her for Home O2. The patient was also found to be hypernatremic on admission and was treated with D5W with improvement. A duplex US was done and showed an acute right popliteal DVT. As a workup for syncopal episode the patient a CT scan of the head that was negative for stroke and an EEG that showed generalized slowing   Today is hospital day 7d, and this morning she is somnolent but can be aroused and follows commands. The daughter states that the patient hasn't slept well during the night. No major events happened overnight     Present Today:           Ferrera Catheter X          Central Line X          IV Fluids X          Drains X      OBJECTIVE  ------------------------------------    PAST MEDICAL & SURGICAL HISTORY  Dementia  History of hip replacement  History of cholecystectomy      ALLERGIES:  codeine (Short breath; Swelling)  shellfish (Short breath; Swelling)      MEDICATIONS:  STANDING MEDICATIONS  apixaban 10 milliGRAM(s) Oral every 12 hours  chlorhexidine 4% Liquid 1 Application(s) Topical <User Schedule>  dextrose 5%. 1000 milliLiter(s) IV Continuous <Continuous>  memantine 10 milliGRAM(s) Oral two times a day    PRN MEDICATIONS        LABS:                        9.1    8.84  )-----------( 191      ( 26 Feb 2019 05:47 )             30.8     02-26    145  |  105  |  19  ----------------------------<  107<H>  4.1   |  31  |  0.6<L>    Ca    8.3<L>      26 Feb 2019 05:47  Mg     2.0     02-26      RADIOLOGY:    < from: CT Angio Chest w/ IV Cont (02.20.19 @ 23:49) >  1.  Acute bilateral main pulmonary artery emboli extending into segmental   and subsegmental branches of all pulmonary lobes, with evidence of right   heart strain.    < from: Transthoracic Echocardiogram (02.20.19 @ 09:05) >   1. LV Ejection Fraction by Aparicio's Method with a biplane EF of 68 %.   2. Moderately increased LV wall thickness.   3. Spectral Doppler shows impaired relaxation pattern of left   ventricular myocardial filling (Grade I diastolic dysfunction).   4. Thickening and calcification of the anterior and posterior mitral   valve leaflets.   5. Moderate-severe tricuspid regurgitation.   6. Mild aortic regurgitation.   7. Pulmonic valve regurgitation.   8. Estimated pulmonary artery systolic pressure is 67.9 mmHg assuming a   right atrial pressure of 15 mmHg, which is consistent with severe   pulmonary hypertension.        PHYSICAL EXAM:  VITAL SIGNS: Last 24 Hours  T(C): 36.1 (26 Feb 2019 05:48), Max: 37.6 (25 Feb 2019 19:51)  T(F): 97 (26 Feb 2019 05:48), Max: 99.6 (25 Feb 2019 19:51)  HR: 67 (26 Feb 2019 05:48) (67 - 95)  BP: 123/57 (26 Feb 2019 05:48) (118/56 - 152/62)  BP(mean): --  RR: 18 (26 Feb 2019 05:48) (18 - 18)  SpO2: 88% (26 Feb 2019 09:29) (78% - 98%)    GENERAL: No distress, somnolent but opens eyes and moans when being called   PULMONARY: Suboptimal exam as patient can lean forward and take deep breaths but decreased breath sounds at the bases   CARDIOVASCULAR: Irregular S1S2   GASTROINTESTINAL: Soft, Nontender, Nondistended   MUSCULOSKELETAL:  + Peripheral Pulses  NEUROLOGY: non-focal, follows commands       ASSESSMENT & PLAN    #) Acute unprovoked submassive PE  - Chest CT angio showed acute bilateral main pulmonary artery emboli extending into segmental and subsegmental branches of all pulmonary lobes, with evidence of right heart strain.  - ECHO shows EF 68% with severe pulm HTN and mod-severe TR  - Hemodynamically stable  - Continue Eliquis 10mg  BID for a total of 14 doses (end on 3/1 AM) and then take 5mg BID for lifelong; family aware of the risk of NOAC  Patient's O2 sat is 93 % on room air at rest, O2 sat drops to 84 % at room air on activity and then improves to 97 % on 3 LPM Nasal O2 with activity. Patient  has bilateral submassive pulmonary embolism  and was tested in a stable chronic state.    #) Acute right popliteal DVT  - Continue Eliquis    #) Syncopal Episode  - Likely from acute pulmonary embolism, NO events on monitor  - Head CT negative for acute infarct  - EEG: generalized slowing, no seizure    #) Hypernatremia  - Resolved s/p D5W IVF - Na 145 today     #) Dementia  - Stable.    - Continue Exelon patch and Memantine    #) Deconditioning  - Patient is on 2 person maximum assist, will need Jose lift for transfer from bed to chair. Without Jose lift she will be confound to bed  - See PT eval    # DVT prophylaxis : Eliquis   # GI prophylaxis : X  # Activity : Ambulate with assistance   # Diet : Dysphagia III soft thin liquids   # Code : FULL  # Disposition : Home with home care

## 2019-02-26 NOTE — PROGRESS NOTE ADULT - SUBJECTIVE AND OBJECTIVE BOX
YURI GOLD MRN-[id number]    Hospitalist Note  89yo F with Past Medical History Dementia and HTN admitted for "freezing"/ altered mental status secondary to dehydration complicated by acute bilateral mainstem pulmonary embolism.    Overnight events/Updates: The patient appears comfortable though sleepy upon interview this AM.  Nursing reports hypoxemia with pulse ox decreasing to 88% on RA.    Vital Signs Last 24 Hrs  T(C): 36.6 (26 Feb 2019 13:47), Max: 37.6 (25 Feb 2019 19:51)  T(F): 97.9 (26 Feb 2019 13:47), Max: 99.6 (25 Feb 2019 19:51)  HR: 77 (26 Feb 2019 13:47) (67 - 95)  BP: 130/60 (26 Feb 2019 13:47) (123/57 - 152/62)  BP(mean): --  RR: 18 (26 Feb 2019 13:47) (18 - 18)  SpO2: 88% (26 Feb 2019 09:29) (88% - 98%)    Physical Examination:  General: AAO x 2-3  HEENT: PERRLA, EOMI  CV= S1 & S2 appreciated  Lungs=CTA BL  Abdominal Examination= + BS, Soft, NT/ND  Extremity Examination= No C/C/E    ROS: No chest pain.  + intermittent dyspnea  All other systems reviewed and are within normal limits except for the complaints in the HPI.    MEDICATIONS  (STANDING):  apixaban 10 milliGRAM(s) Oral every 12 hours  chlorhexidine 4% Liquid 1 Application(s) Topical <User Schedule>  dextrose 5%. 1000 milliLiter(s) (50 mL/Hr) IV Continuous <Continuous>  memantine 10 milliGRAM(s) Oral two times a day    MEDICATIONS  (PRN):                            9.1    8.84  )-----------( 191      ( 26 Feb 2019 05:47 )             30.8     02-26    145  |  105  |  19  ----------------------------<  107<H>  4.1   |  31  |  0.6<L>    Ca    8.3<L>      26 Feb 2019 05:47  Mg     2.0     02-26        Case discussed with housestaff & family  LON Parisi [id number]

## 2019-03-01 RX ORDER — APIXABAN 2.5 MG/1
1 TABLET, FILM COATED ORAL
Qty: 60 | Refills: 0 | OUTPATIENT
Start: 2019-03-01 | End: 2019-03-30

## 2019-03-04 DIAGNOSIS — I26.99 OTHER PULMONARY EMBOLISM WITHOUT ACUTE COR PULMONALE: ICD-10-CM

## 2019-03-04 DIAGNOSIS — E87.0 HYPEROSMOLALITY AND HYPERNATREMIA: ICD-10-CM

## 2019-03-04 DIAGNOSIS — I07.1 RHEUMATIC TRICUSPID INSUFFICIENCY: ICD-10-CM

## 2019-03-04 DIAGNOSIS — F03.90 UNSPECIFIED DEMENTIA WITHOUT BEHAVIORAL DISTURBANCE: ICD-10-CM

## 2019-03-04 DIAGNOSIS — Z96.649 PRESENCE OF UNSPECIFIED ARTIFICIAL HIP JOINT: ICD-10-CM

## 2019-03-04 DIAGNOSIS — R55 SYNCOPE AND COLLAPSE: ICD-10-CM

## 2019-03-04 DIAGNOSIS — I95.9 HYPOTENSION, UNSPECIFIED: ICD-10-CM

## 2019-03-04 DIAGNOSIS — I82.432 ACUTE EMBOLISM AND THROMBOSIS OF LEFT POPLITEAL VEIN: ICD-10-CM

## 2019-03-04 DIAGNOSIS — I44.0 ATRIOVENTRICULAR BLOCK, FIRST DEGREE: ICD-10-CM

## 2019-03-04 DIAGNOSIS — Z83.3 FAMILY HISTORY OF DIABETES MELLITUS: ICD-10-CM

## 2019-03-04 DIAGNOSIS — I10 ESSENTIAL (PRIMARY) HYPERTENSION: ICD-10-CM

## 2019-03-04 DIAGNOSIS — I27.20 PULMONARY HYPERTENSION, UNSPECIFIED: ICD-10-CM

## 2019-03-04 DIAGNOSIS — Z86.73 PERSONAL HISTORY OF TRANSIENT ISCHEMIC ATTACK (TIA), AND CEREBRAL INFARCTION WITHOUT RESIDUAL DEFICITS: ICD-10-CM

## 2019-03-09 ENCOUNTER — APPOINTMENT (OUTPATIENT)
Dept: GERIATRICS | Facility: HOME HEALTH | Age: 84
End: 2019-03-09

## 2019-03-11 PROBLEM — F03.90 UNSPECIFIED DEMENTIA WITHOUT BEHAVIORAL DISTURBANCE: Chronic | Status: ACTIVE | Noted: 2019-02-19

## 2019-03-23 ENCOUNTER — APPOINTMENT (OUTPATIENT)
Dept: GERIATRICS | Facility: HOME HEALTH | Age: 84
End: 2019-03-23

## 2019-03-24 NOTE — REVIEW OF SYSTEMS
[Arthralgias] : arthralgias [Joint Pain] : joint pain [Joint Stiffness] : joint stiffness [Negative] : Gastrointestinal [de-identified] : some lumpy massesfelt on both buttocks as per caregivers

## 2019-03-24 NOTE — HISTORY OF PRESENT ILLNESS
[FreeTextEntry1] : pt is homebound due to her dementia and gait instability. Pt dtr stated tht her mom appears to have developed some lumps in her buttocks. overlying skin is intact. US suspected to be lipomas and F/u w interval US or biopsy suuggested [1] : 2) Feeling down, depressed, or hopeless for several days

## 2019-03-24 NOTE — PHYSICAL EXAM
[General Appearance - In No Acute Distress] : in no acute distress [Auscultation Breath Sounds / Voice Sounds] : lungs were clear to auscultation bilaterally [Heart Rate And Rhythm] : heart rate was normal and rhythm regular [Heart Sounds] : normal S1 and S2 [Heart Sounds Gallop] : no gallops [Murmurs] : no murmurs [Heart Sounds Pericardial Friction Rub] : no pericardial rub [Bowel Sounds] : normal bowel sounds [Abdomen Soft] : soft [Abdomen Tenderness] : non-tender [] : no hepato-splenomegaly [Abdomen Mass (___ Cm)] : no abdominal mass palpated [Cervical Lymph Nodes Enlarged Posterior Bilaterally] : posterior cervical [Cervical Lymph Nodes Enlarged Anterior Bilaterally] : anterior cervical [Supraclavicular Lymph Nodes Enlarged Bilaterally] : supraclavicular [Axillary Lymph Nodes Enlarged Bilaterally] : axillary [Femoral Lymph Nodes Enlarged Bilaterally] : femoral [Inguinal Lymph Nodes Enlarged Bilaterally] : inguinal [No CVA Tenderness] : no ~M costovertebral angle tenderness [No Spinal Tenderness] : no spinal tenderness [FreeTextEntry1] : some lumpy masses, non tender, soft, mobile, nonadherent about 3x5 cms felt on b/l buttocks on deep palpation

## 2019-05-18 ENCOUNTER — APPOINTMENT (OUTPATIENT)
Dept: GERIATRICS | Facility: HOME HEALTH | Age: 84
End: 2019-05-18

## 2019-05-20 NOTE — PHYSICAL EXAM
[General Appearance - Alert] : alert [General Appearance - In No Acute Distress] : in no acute distress [Sclera] : the sclera and conjunctiva were normal [PERRL With Normal Accommodation] : pupils were equal in size, round, and reactive to light [Extraocular Movements] : extraocular movements were intact [Outer Ear] : the ears and nose were normal in appearance [Oropharynx] : the oropharynx was normal [Neck Appearance] : the appearance of the neck was normal [Neck Cervical Mass (___cm)] : no neck mass was observed [Jugular Venous Distention Increased] : there was no jugular-venous distention [Thyroid Diffuse Enlargement] : the thyroid was not enlarged [Thyroid Nodule] : there were no palpable thyroid nodules [Auscultation Breath Sounds / Voice Sounds] : lungs were clear to auscultation bilaterally [Heart Rate And Rhythm] : heart rate was normal and rhythm regular [Heart Sounds] : normal S1 and S2 [Heart Sounds Gallop] : no gallops [Murmurs] : no murmurs [Heart Sounds Pericardial Friction Rub] : no pericardial rub [Breast Appearance] : normal in appearance [Breast Palpation Mass] : no palpable masses [Bowel Sounds] : normal bowel sounds [Abdomen Soft] : soft [Abdomen Tenderness] : non-tender [Abdomen Mass (___ Cm)] : no abdominal mass palpated [Ataxic] : ataxic [Skin Color & Pigmentation] : normal skin color and pigmentation [Skin Turgor] : normal skin turgor [] : no rash

## 2019-05-20 NOTE — HISTORY OF PRESENT ILLNESS
[FreeTextEntry1] : pt is homebound sec to dementia. dtr called for pt having persistent cough productive of yellowish sputum for last few wks. CXR neg for any pathology or lung infiltrates. denies F/Chills/SOB.Pt is on home oxygen x 24 hrs. at present using 2LNC. Pt has completed the course of Abx 3 wks back

## 2019-06-18 ENCOUNTER — RX RENEWAL (OUTPATIENT)
Age: 84
End: 2019-06-18

## 2019-07-06 ENCOUNTER — APPOINTMENT (OUTPATIENT)
Dept: GERIATRICS | Facility: HOME HEALTH | Age: 84
End: 2019-07-06
Payer: MEDICARE

## 2019-07-11 ENCOUNTER — APPOINTMENT (OUTPATIENT)
Dept: GERIATRICS | Facility: HOME HEALTH | Age: 84
End: 2019-07-11
Payer: MEDICARE

## 2019-07-11 PROCEDURE — 99348 HOME/RES VST EST LOW MDM 30: CPT

## 2019-07-11 NOTE — REVIEW OF SYSTEMS
[Discharge From Eyes] : purulent discharge from the eyes [Incontinence] : incontinence [Skin Lesions] : skin lesion [Arthralgias] : arthralgias [Joint Stiffness] : joint stiffness [Limb Weakness] : limb weakness [Emotional Problems] : emotional problems [Difficulty Walking] : difficulty walking [Negative] : Gastrointestinal [de-identified] : rash on private area [FreeTextEntry3] : puffiness of eyelids.clear .wattery discharge

## 2019-07-11 NOTE — HISTORY OF PRESENT ILLNESS
[FreeTextEntry1] : pt is homebound due to dementia and being bedbound..t has a rash on the private area , also seems to have developed swelling of face and under eyes b/l. over the last wk.pt also has clear intermittent wattery discharge from both eyes. Denies pain in eyes.no h/o sick contat

## 2019-07-11 NOTE — PHYSICAL EXAM
[General Appearance - In No Acute Distress] : in no acute distress [Neck Cervical Mass (___cm)] : no neck mass was observed [Jugular Venous Distention Increased] : there was no jugular-venous distention [Neck Appearance] : the appearance of the neck was normal [Thyroid Nodule] : there were no palpable thyroid nodules [Thyroid Diffuse Enlargement] : the thyroid was not enlarged [Heart Sounds] : normal S1 and S2 [Heart Rate And Rhythm] : heart rate was normal and rhythm regular [Auscultation Breath Sounds / Voice Sounds] : lungs were clear to auscultation bilaterally [Heart Sounds Gallop] : no gallops [Full Pulse] : the pedal pulses are present [Heart Sounds Pericardial Friction Rub] : no pericardial rub [Murmurs] : no murmurs [Bowel Sounds] : normal bowel sounds [Edema] : there was no peripheral edema [Abdomen Soft] : soft [] : no hepato-splenomegaly [Abdomen Tenderness] : non-tender [Abdomen Mass (___ Cm)] : no abdominal mass palpated [FreeTextEntry1] : about 1 cm erythematous,pruritic sapule on upper part of labia majora [No CVA Tenderness] : no ~M costovertebral angle tenderness [Ataxic] : ataxic [No Spinal Tenderness] : no spinal tenderness

## 2019-09-25 ENCOUNTER — APPOINTMENT (OUTPATIENT)
Dept: GERIATRICS | Facility: HOME HEALTH | Age: 84
End: 2019-09-25
Payer: MEDICARE

## 2019-09-25 DIAGNOSIS — M19.90 UNSPECIFIED OSTEOARTHRITIS, UNSPECIFIED SITE: ICD-10-CM

## 2019-09-25 DIAGNOSIS — R29.6 REPEATED FALLS: ICD-10-CM

## 2019-09-25 DIAGNOSIS — F03.90 UNSPECIFIED DEMENTIA W/OUT BEHAVIORAL DISTURBANCE: ICD-10-CM

## 2019-09-25 PROCEDURE — 99347 HOME/RES VST EST SF MDM 20: CPT

## 2019-09-26 ENCOUNTER — APPOINTMENT (OUTPATIENT)
Dept: GERIATRICS | Facility: HOME HEALTH | Age: 84
End: 2019-09-26
Payer: MEDICARE

## 2019-10-15 PROBLEM — M19.90 OSTEOARTHRITIS, CHRONIC: Status: ACTIVE | Noted: 2019-10-15

## 2019-10-15 PROBLEM — R29.6 FALLS FREQUENTLY: Status: ACTIVE | Noted: 2019-10-15

## 2019-10-15 PROBLEM — F03.90 DEMENTIA, SENILE: Status: ACTIVE | Noted: 2019-10-15

## 2019-10-15 NOTE — HISTORY OF PRESENT ILLNESS
[1] : 1) Little interest or pleasure doing things for several days [0] : 2) Feeling down, depressed, or hopeless: Not at all [FreeTextEntry1] : Pt is homebound due to her dementia,and old age plus bedbound status, Dtr states of pt having dry , non productive cough for about 5 days and a rash of private parts, Pt also been noted to have spotting in her stools w diaper stained w blood, PMH is p[os for piles and constipation. Urine is foul smelling and dark yellowish, No h/o F/Chills/SOB.Family agrees for flu shot

## 2019-12-03 ENCOUNTER — OTHER (OUTPATIENT)
Age: 84
End: 2019-12-03

## 2019-12-04 ENCOUNTER — LABORATORY RESULT (OUTPATIENT)
Age: 84
End: 2019-12-04

## 2019-12-04 ENCOUNTER — OUTPATIENT (OUTPATIENT)
Dept: OUTPATIENT SERVICES | Facility: HOSPITAL | Age: 84
LOS: 1 days | Discharge: HOME | End: 2019-12-04

## 2019-12-04 DIAGNOSIS — Z96.649 PRESENCE OF UNSPECIFIED ARTIFICIAL HIP JOINT: Chronic | ICD-10-CM

## 2019-12-04 DIAGNOSIS — Z90.49 ACQUIRED ABSENCE OF OTHER SPECIFIED PARTS OF DIGESTIVE TRACT: Chronic | ICD-10-CM

## 2019-12-04 DIAGNOSIS — R79.89 OTHER SPECIFIED ABNORMAL FINDINGS OF BLOOD CHEMISTRY: ICD-10-CM

## 2019-12-07 ENCOUNTER — APPOINTMENT (OUTPATIENT)
Dept: GERIATRICS | Facility: HOME HEALTH | Age: 84
End: 2019-12-07
Payer: MEDICARE

## 2019-12-07 PROCEDURE — 99348 HOME/RES VST EST LOW MDM 30: CPT

## 2019-12-14 RX ORDER — SACCHAROMYCES BOULARDII 50 MG
CAPSULE ORAL TWICE DAILY
Refills: 0 | Status: ACTIVE | COMMUNITY

## 2019-12-14 RX ORDER — SIMETHICONE 125 MG/1
500 CAPSULE, LIQUID FILLED ORAL DAILY
Refills: 0 | Status: ACTIVE | COMMUNITY

## 2019-12-14 RX ORDER — FOLIC ACID/VIT B COMPLEX AND C 5 MG
TABLET ORAL DAILY
Refills: 0 | Status: ACTIVE | COMMUNITY

## 2019-12-14 RX ORDER — UBIDECARENONE 60 MG
WAFER ORAL DAILY
Refills: 0 | Status: ACTIVE | COMMUNITY

## 2019-12-14 RX ORDER — GUAIFENESIN 600 MG/1
600 TABLET, EXTENDED RELEASE ORAL TWICE DAILY
Refills: 0 | Status: ACTIVE | COMMUNITY

## 2019-12-14 RX ORDER — MULTIVIT-MIN/IRON/FOLIC ACID/K 18-600-40
50 MCG CAPSULE ORAL
Refills: 0 | Status: ACTIVE | COMMUNITY

## 2019-12-14 NOTE — PHYSICAL EXAM
[General Appearance - In No Acute Distress] : in no acute distress [Neck Appearance] : the appearance of the neck was normal [Neck Cervical Mass (___cm)] : no neck mass was observed [Jugular Venous Distention Increased] : there was no jugular-venous distention [Thyroid Diffuse Enlargement] : the thyroid was not enlarged [Thyroid Nodule] : there were no palpable thyroid nodules [Auscultation Breath Sounds / Voice Sounds] : lungs were clear to auscultation bilaterally [Heart Rate And Rhythm] : heart rate was normal and rhythm regular [Heart Sounds] : normal S1 and S2 [Heart Sounds Gallop] : no gallops [Murmurs] : no murmurs [Heart Sounds Pericardial Friction Rub] : no pericardial rub [Bowel Sounds] : normal bowel sounds [Abdomen Soft] : soft [Abdomen Tenderness] : non-tender [Abdomen Mass (___ Cm)] : no abdominal mass palpated [Ataxic] : ataxic [Skin Color & Pigmentation] : normal skin color and pigmentation [Skin Turgor] : normal skin turgor [] : no rash [FreeTextEntry1] : s/p dementia

## 2019-12-14 NOTE — REVIEW OF SYSTEMS
[Feeling Poorly] : feeling poorly [Feeling Tired] : feeling tired [Incontinence] : incontinence [Arthralgias] : arthralgias [Joint Pain] : joint pain [Joint Stiffness] : joint stiffness [Limb Weakness] : limb weakness [Difficulty Walking] : difficulty walking [Anxiety] : anxiety [Negative] : Integumentary

## 2019-12-14 NOTE — HISTORY OF PRESENT ILLNESS
[FreeTextEntry1] : pt is home bound due to dementia and gait instability. Dtr called for pt having lethargy, seems weak and having some dry cough. no h/o F/chills/SOB / Urinary or abdominal symptoms. PT sleep and appetite are fairly well. Pt also has c/o some sticky discharge w swollen and red R eye [1] : 1) Little interest or pleasure doing things for several days [0] : 2) Feeling down, depressed, or hopeless: Not at all

## 2019-12-31 ENCOUNTER — OTHER (OUTPATIENT)
Age: 84
End: 2019-12-31

## 2020-01-04 ENCOUNTER — APPOINTMENT (OUTPATIENT)
Dept: GERIATRICS | Facility: HOME HEALTH | Age: 85
End: 2020-01-04
Payer: MEDICARE

## 2020-01-04 PROCEDURE — 99348 HOME/RES VST EST LOW MDM 30: CPT

## 2020-01-07 ENCOUNTER — LABORATORY RESULT (OUTPATIENT)
Age: 85
End: 2020-01-07

## 2020-01-08 ENCOUNTER — OTHER (OUTPATIENT)
Age: 85
End: 2020-01-08

## 2020-01-10 NOTE — HISTORY OF PRESENT ILLNESS
[FreeTextEntry1] : Pt is homebound due to dementia. Grand dtr called for pt having cough , congestion and pt seemingly lethargic for about1 wk, appetite is reduced and sleep is disturbed..CXR ordered but negative for any lung infiltrates.UA x CxS ordered result pending

## 2020-01-10 NOTE — PHYSICAL EXAM
[Sclera] : the sclera and conjunctiva were normal [PERRL With Normal Accommodation] : pupils were equal in size, round, and reactive to light [Extraocular Movements] : extraocular movements were intact [Outer Ear] : the ears and nose were normal in appearance [Oropharynx] : the oropharynx was normal [FreeTextEntry1] : reduced air entry b/l. crepts at LLL [Heart Rate And Rhythm] : heart rate was normal and rhythm regular [Heart Sounds] : normal S1 and S2 [Heart Sounds Gallop] : no gallops [Murmurs] : no murmurs [Heart Sounds Pericardial Friction Rub] : no pericardial rub [Bowel Sounds] : normal bowel sounds [Abdomen Soft] : soft [Abdomen Tenderness] : non-tender [Abdomen Mass (___ Cm)] : no abdominal mass palpated [Ataxic] : ataxic [Skin Color & Pigmentation] : normal skin color and pigmentation [Skin Turgor] : normal skin turgor [] : no rash

## 2020-01-10 NOTE — REVIEW OF SYSTEMS
[Feeling Poorly] : feeling poorly [Cough] : cough [Incontinence] : incontinence [Arthralgias] : arthralgias [Joint Stiffness] : joint stiffness [Limb Weakness] : limb weakness [Difficulty Walking] : difficulty walking [Negative] : Integumentary [FreeTextEntry6] : congestion. Pt unable to cough up sputum

## 2020-02-28 ENCOUNTER — EMERGENCY (EMERGENCY)
Facility: HOSPITAL | Age: 85
LOS: 0 days | Discharge: HOME | End: 2020-02-28
Attending: EMERGENCY MEDICINE | Admitting: EMERGENCY MEDICINE
Payer: MEDICARE

## 2020-02-28 VITALS — RESPIRATION RATE: 20 BRPM | HEART RATE: 72 BPM | OXYGEN SATURATION: 84 %

## 2020-02-28 VITALS — OXYGEN SATURATION: 99 % | RESPIRATION RATE: 16 BRPM | HEART RATE: 39 BPM

## 2020-02-28 DIAGNOSIS — Z87.891 PERSONAL HISTORY OF NICOTINE DEPENDENCE: ICD-10-CM

## 2020-02-28 DIAGNOSIS — A41.9 SEPSIS, UNSPECIFIED ORGANISM: ICD-10-CM

## 2020-02-28 DIAGNOSIS — I46.9 CARDIAC ARREST, CAUSE UNSPECIFIED: ICD-10-CM

## 2020-02-28 DIAGNOSIS — Z90.49 ACQUIRED ABSENCE OF OTHER SPECIFIED PARTS OF DIGESTIVE TRACT: Chronic | ICD-10-CM

## 2020-02-28 DIAGNOSIS — Z96.649 PRESENCE OF UNSPECIFIED ARTIFICIAL HIP JOINT: Chronic | ICD-10-CM

## 2020-02-28 DIAGNOSIS — R41.82 ALTERED MENTAL STATUS, UNSPECIFIED: ICD-10-CM

## 2020-02-28 DIAGNOSIS — J96.90 RESPIRATORY FAILURE, UNSPECIFIED, UNSPECIFIED WHETHER WITH HYPOXIA OR HYPERCAPNIA: ICD-10-CM

## 2020-02-28 LAB
ALBUMIN SERPL ELPH-MCNC: 3.3 G/DL — LOW (ref 3.5–5.2)
ALP SERPL-CCNC: 66 U/L — SIGNIFICANT CHANGE UP (ref 30–115)
ALT FLD-CCNC: 12 U/L — SIGNIFICANT CHANGE UP (ref 0–41)
ANION GAP SERPL CALC-SCNC: 7 MMOL/L — SIGNIFICANT CHANGE UP (ref 7–14)
APPEARANCE UR: ABNORMAL
AST SERPL-CCNC: 37 U/L — SIGNIFICANT CHANGE UP (ref 0–41)
BACTERIA # UR AUTO: NEGATIVE — SIGNIFICANT CHANGE UP
BASE EXCESS BLDV CALC-SCNC: 12.8 MMOL/L — HIGH (ref -2–2)
BASOPHILS # BLD AUTO: 0.02 K/UL — SIGNIFICANT CHANGE UP (ref 0–0.2)
BASOPHILS NFR BLD AUTO: 0.2 % — SIGNIFICANT CHANGE UP (ref 0–1)
BILIRUB SERPL-MCNC: 0.5 MG/DL — SIGNIFICANT CHANGE UP (ref 0.2–1.2)
BILIRUB UR-MCNC: NEGATIVE — SIGNIFICANT CHANGE UP
BUN SERPL-MCNC: 26 MG/DL — HIGH (ref 10–20)
CA-I SERPL-SCNC: 1.24 MMOL/L — SIGNIFICANT CHANGE UP (ref 1.12–1.3)
CALCIUM SERPL-MCNC: 8.9 MG/DL — SIGNIFICANT CHANGE UP (ref 8.5–10.1)
CHLORIDE SERPL-SCNC: 102 MMOL/L — SIGNIFICANT CHANGE UP (ref 98–110)
CO2 SERPL-SCNC: 39 MMOL/L — HIGH (ref 17–32)
COLOR SPEC: ABNORMAL
CREAT SERPL-MCNC: 0.7 MG/DL — SIGNIFICANT CHANGE UP (ref 0.7–1.5)
DIFF PNL FLD: ABNORMAL
EOSINOPHIL # BLD AUTO: 0 K/UL — SIGNIFICANT CHANGE UP (ref 0–0.7)
EOSINOPHIL NFR BLD AUTO: 0 % — SIGNIFICANT CHANGE UP (ref 0–8)
EPI CELLS # UR: 2 /HPF — SIGNIFICANT CHANGE UP (ref 0–5)
GAS PNL BLDV: 147 MMOL/L — HIGH (ref 136–145)
GAS PNL BLDV: SIGNIFICANT CHANGE UP
GLUCOSE SERPL-MCNC: 147 MG/DL — HIGH (ref 70–99)
GLUCOSE UR QL: NEGATIVE — SIGNIFICANT CHANGE UP
HCO3 BLDV-SCNC: 43 MMOL/L — HIGH (ref 22–29)
HCT VFR BLD CALC: 35.7 % — LOW (ref 37–47)
HCT VFR BLDA CALC: 31.6 % — LOW (ref 34–44)
HGB BLD CALC-MCNC: 10.3 G/DL — LOW (ref 14–18)
HGB BLD-MCNC: 10.1 G/DL — LOW (ref 12–16)
HYALINE CASTS # UR AUTO: 14 /LPF — HIGH (ref 0–7)
IMM GRANULOCYTES NFR BLD AUTO: 0.7 % — HIGH (ref 0.1–0.3)
KETONES UR-MCNC: NEGATIVE — SIGNIFICANT CHANGE UP
LACTATE BLDV-MCNC: 0.7 MMOL/L — SIGNIFICANT CHANGE UP (ref 0.5–1.6)
LACTATE SERPL-SCNC: 1.1 MMOL/L — SIGNIFICANT CHANGE UP (ref 0.7–2)
LEUKOCYTE ESTERASE UR-ACNC: NEGATIVE — SIGNIFICANT CHANGE UP
LIDOCAIN IGE QN: 9 U/L — SIGNIFICANT CHANGE UP (ref 7–60)
LYMPHOCYTES # BLD AUTO: 0.84 K/UL — LOW (ref 1.2–3.4)
LYMPHOCYTES # BLD AUTO: 7 % — LOW (ref 20.5–51.1)
MCHC RBC-ENTMCNC: 28.3 G/DL — LOW (ref 32–37)
MCHC RBC-ENTMCNC: 28.6 PG — SIGNIFICANT CHANGE UP (ref 27–31)
MCV RBC AUTO: 101.1 FL — HIGH (ref 81–99)
MONOCYTES # BLD AUTO: 0.68 K/UL — HIGH (ref 0.1–0.6)
MONOCYTES NFR BLD AUTO: 5.7 % — SIGNIFICANT CHANGE UP (ref 1.7–9.3)
NEUTROPHILS # BLD AUTO: 10.34 K/UL — HIGH (ref 1.4–6.5)
NEUTROPHILS NFR BLD AUTO: 86.4 % — HIGH (ref 42.2–75.2)
NITRITE UR-MCNC: NEGATIVE — SIGNIFICANT CHANGE UP
NRBC # BLD: 0 /100 WBCS — SIGNIFICANT CHANGE UP (ref 0–0)
PCO2 BLDV: 102 MMHG — HIGH (ref 41–51)
PH BLDV: 7.24 — LOW (ref 7.26–7.43)
PH UR: 6 — SIGNIFICANT CHANGE UP (ref 5–8)
PLATELET # BLD AUTO: 176 K/UL — SIGNIFICANT CHANGE UP (ref 130–400)
PO2 BLDV: 58 MMHG — HIGH (ref 20–40)
POTASSIUM BLDV-SCNC: 4.7 MMOL/L — SIGNIFICANT CHANGE UP (ref 3.3–5.6)
POTASSIUM SERPL-MCNC: 5.4 MMOL/L — HIGH (ref 3.5–5)
POTASSIUM SERPL-SCNC: 5.4 MMOL/L — HIGH (ref 3.5–5)
PROT SERPL-MCNC: 6.6 G/DL — SIGNIFICANT CHANGE UP (ref 6–8)
PROT UR-MCNC: ABNORMAL
RBC # BLD: 3.53 M/UL — LOW (ref 4.2–5.4)
RBC # FLD: 14.8 % — HIGH (ref 11.5–14.5)
RBC CASTS # UR COMP ASSIST: >720 /HPF — HIGH (ref 0–4)
SAO2 % BLDV: 88 % — SIGNIFICANT CHANGE UP
SODIUM SERPL-SCNC: 148 MMOL/L — HIGH (ref 135–146)
SP GR SPEC: 1.03 — HIGH (ref 1.01–1.02)
TROPONIN T SERPL-MCNC: 0.02 NG/ML — HIGH
UROBILINOGEN FLD QL: SIGNIFICANT CHANGE UP
WBC # BLD: 11.96 K/UL — HIGH (ref 4.8–10.8)
WBC # FLD AUTO: 11.96 K/UL — HIGH (ref 4.8–10.8)
WBC UR QL: 23 /HPF — HIGH (ref 0–5)

## 2020-02-28 PROCEDURE — 99291 CRITICAL CARE FIRST HOUR: CPT

## 2020-02-28 PROCEDURE — 71045 X-RAY EXAM CHEST 1 VIEW: CPT | Mod: 26

## 2020-02-28 PROCEDURE — 99292 CRITICAL CARE ADDL 30 MIN: CPT

## 2020-02-28 RX ORDER — VANCOMYCIN HCL 1 G
1000 VIAL (EA) INTRAVENOUS EVERY 12 HOURS
Refills: 0 | Status: DISCONTINUED | OUTPATIENT
Start: 2020-02-29 | End: 2020-02-28

## 2020-02-28 RX ORDER — NOREPINEPHRINE BITARTRATE/D5W 8 MG/250ML
0.05 PLASTIC BAG, INJECTION (ML) INTRAVENOUS
Qty: 8 | Refills: 0 | Status: DISCONTINUED | OUTPATIENT
Start: 2020-02-28 | End: 2020-02-28

## 2020-02-28 RX ORDER — CEFEPIME 1 G/1
INJECTION, POWDER, FOR SOLUTION INTRAMUSCULAR; INTRAVENOUS
Refills: 0 | Status: DISCONTINUED | OUTPATIENT
Start: 2020-02-28 | End: 2020-02-28

## 2020-02-28 RX ORDER — ACETAMINOPHEN 500 MG
1000 TABLET ORAL ONCE
Refills: 0 | Status: COMPLETED | OUTPATIENT
Start: 2020-02-28 | End: 2020-02-28

## 2020-02-28 RX ORDER — ATROPINE SULFATE 0.1 MG/ML
0.5 SYRINGE (ML) INJECTION ONCE
Refills: 0 | Status: COMPLETED | OUTPATIENT
Start: 2020-02-28 | End: 2020-02-28

## 2020-02-28 RX ORDER — VANCOMYCIN HCL 1 G
VIAL (EA) INTRAVENOUS
Refills: 0 | Status: DISCONTINUED | OUTPATIENT
Start: 2020-02-28 | End: 2020-02-28

## 2020-02-28 RX ORDER — SODIUM CHLORIDE 9 MG/ML
2000 INJECTION INTRAMUSCULAR; INTRAVENOUS; SUBCUTANEOUS ONCE
Refills: 0 | Status: COMPLETED | OUTPATIENT
Start: 2020-02-28 | End: 2020-02-28

## 2020-02-28 RX ORDER — CEFEPIME 1 G/1
1000 INJECTION, POWDER, FOR SOLUTION INTRAMUSCULAR; INTRAVENOUS EVERY 8 HOURS
Refills: 0 | Status: DISCONTINUED | OUTPATIENT
Start: 2020-02-28 | End: 2020-02-28

## 2020-02-28 RX ORDER — CEFEPIME 1 G/1
1000 INJECTION, POWDER, FOR SOLUTION INTRAMUSCULAR; INTRAVENOUS ONCE
Refills: 0 | Status: COMPLETED | OUTPATIENT
Start: 2020-02-28 | End: 2020-02-28

## 2020-02-28 RX ORDER — PHENYLEPHRINE HYDROCHLORIDE 10 MG/ML
0.2 INJECTION INTRAVENOUS ONCE
Refills: 0 | Status: COMPLETED | OUTPATIENT
Start: 2020-02-28 | End: 2020-02-28

## 2020-02-28 RX ORDER — VANCOMYCIN HCL 1 G
1000 VIAL (EA) INTRAVENOUS ONCE
Refills: 0 | Status: COMPLETED | OUTPATIENT
Start: 2020-02-28 | End: 2020-02-28

## 2020-02-28 RX ADMIN — Medication 1000 MILLIGRAM(S): at 17:00

## 2020-02-28 RX ADMIN — CEFEPIME 100 MILLIGRAM(S): 1 INJECTION, POWDER, FOR SOLUTION INTRAMUSCULAR; INTRAVENOUS at 17:12

## 2020-02-28 RX ADMIN — SODIUM CHLORIDE 4000 MILLILITER(S): 9 INJECTION INTRAMUSCULAR; INTRAVENOUS; SUBCUTANEOUS at 17:16

## 2020-02-28 RX ADMIN — PHENYLEPHRINE HYDROCHLORIDE 0.2 MILLIGRAM(S): 10 INJECTION INTRAVENOUS at 18:00

## 2020-02-28 RX ADMIN — Medication 250 MILLIGRAM(S): at 17:28

## 2020-02-28 RX ADMIN — PHENYLEPHRINE HYDROCHLORIDE 0.2 MILLIGRAM(S): 10 INJECTION INTRAVENOUS at 17:53

## 2020-02-28 RX ADMIN — Medication 0.5 MILLIGRAM(S): at 17:55

## 2020-02-28 RX ADMIN — Medication 5.1 MICROGRAM(S)/KG/MIN: at 17:56

## 2020-02-28 NOTE — ED PROCEDURE NOTE - CPROC ED INFUS LINE DETAIL1
All lumen(s) aspirated and flushed without difficulty./Ultrasound guidance was used during placement./The guidewire was recovered./The location was identified, and the area was draped and prepped.

## 2020-02-28 NOTE — ED ADULT NURSE REASSESSMENT NOTE - NS ED NURSE REASSESS COMMENT FT1
pt's daughter at bedside initially refused aggressive measures and intubation of patient upon arrival to the ED.   at 1750, after speaking with other family members, pt's daughter agreed to proceed with intubation of patient after decompensation on BIPAP.

## 2020-02-28 NOTE — ED PROVIDER NOTE - CLINICAL SUMMARY MEDICAL DECISION MAKING FREE TEXT BOX
Patient presented in severe respiratory distress. Patient power of  Vani declined initial intubation, understood the risks and benefits of decision, understands that patient is critically ill and is decompensating rapidly due to the tachypnea and the hypercapnia. Family understands that patient is not a bipap candidate due to altered mental status, found to be febrile upon arrival and full septic treatment given, 500 cc of fluids given slowly due to unknown code status and cxr with congestion. Patient family would like to avoid intubation at this time and continue to with bipap. Patient was not improving with bipap, initially became tachycardia and hypotensive, map 60, then patient became started to become bradycardic. I had a discussion with daughter Vani ( power of ), granddaughter who is a psychiatrist, grandson who is an internist and another clinician who is part of the family, and after a group discussion in front of nurse critical lead RN Bebeto, decision made by family to rapidly intubate. Patient continued to become bradycardic, was in pea arrest. Patient successfully intubated, acls initiated, please see RN code sheet for details, patient also given tnk for pea in setting of immobilized patient. family ebdside during code, central line placed, family together decided to stop resucitative efforts. Patient was intubated, on pressors, on antibiotics. Ferrera cathter displayed large blood upon initial placement in the ED. TOD 6:30 pm.  called.

## 2020-02-28 NOTE — ED PROVIDER NOTE - PHYSICAL EXAMINATION
CONSTITUTIONAL: + respiratory distress, eyes closed, not responding to verbal or tactile commands, not communicating with staff, Condition is guarded and prognosis poor appearing based on initial presentation. Overall gray/pale colored  HEAD: atraumatic.  EYES: Eyes closed, no spontaneous opening  ENT: + mucus discharge, + dry MM, + shallow breathing  NECK:  No JVD  CARD: S1, S2 normal; patient tachycardic on exam, blood pressure labile  RESP: Bilateral rhonchi with diminished breath sounds at the bases, + shallow respiratory, tachypneic to a rate of 28-35, labored breathing  ABD: soft; non-distended; No grimace to palpation No CVA tenderness. No pulsatile abdominal mass. + Strong and Symmetric Pulses  EXT: No edema. Cap refill dimished, greater than 6 seconds, pale, cool and mottled  NEURO: Unable to assess, not moving to commands, no response to sternal rub

## 2020-02-28 NOTE — ED ADULT NURSE REASSESSMENT NOTE - NS ED NURSE REASSESS COMMENT FT1
pt placed on BIPAP. family continues to debate intubation. second IV access site obtained. cardiac monitoring continues to be in place.

## 2020-02-28 NOTE — ED PROVIDER NOTE - CRITICAL CARE PROVIDED
consult w/ pt's family directly relating to pts condition/direct patient care (not related to procedure)/documentation/interpretation of diagnostic studies/additional history taking/conducted a detailed discussion of DNR status/telephone consultation with the patient's family

## 2020-02-28 NOTE — ED ADULT NURSE REASSESSMENT NOTE - NS ED NURSE REASSESS COMMENT FT1
pt currently on nom-rebreather oxygen mask to obtain adequate oxygenation levels. pt's daughter is at the bedside, unable to make a decision about intubation this time. wishes to speak to her brother who is a physician before any decision making. pt will be placed on BIPAP in attempt to adequately oxygenate pt. pt RR = 24 at this time. lethargic. pt currently on nom-rebreather oxygen mask to obtain adequate oxygenation levels. pt's daughter is at the bedside, unable to make a decision about intubation this time. wishes to speak to her brother who is a physician before any decision making. pt will be placed on BIPAP in attempt to adequately oxygenate pt. pt RR = 24 at this time. lethargic.  daughter at bedside is refusing CT scans with IV contrast at this time. MD made aware. pt currently on non-rebreather oxygen mask to obtain adequate oxygenation levels. pt's daughter is at the bedside, unable to make a decision about intubation this time. wishes to speak to her brother who is a physician before any decision making. pt will be placed on BIPAP in attempt to adequately oxygenate pt. pt RR = 24 at this time. lethargic.  daughter at bedside is refusing CT scans with IV contrast at this time. MD made aware.

## 2020-02-28 NOTE — ED PROVIDER NOTE - CARE PLAN
Principal Discharge DX:	Cardiac arrest  Secondary Diagnosis:	Respiratory failure  Secondary Diagnosis:	Sepsis

## 2020-02-28 NOTE — ED PROVIDER NOTE - OBJECTIVE STATEMENT
89 year old female, pmhx as stated in the chart, patient accompanied by daughter Vani who is also power of . patient has a history of pulmonary embolism, since the event has not ambulated, has been bed bound at home, Jose used to lift patient, poor feeding, patient has had gradual chronic decline since the event. patient today is bib daughter Vani and ems for altered mental status, tachycardia and shallow breathing. As per daughter, patient has been declining over past few day, + copious mucus discharge with a productive cough. As per ems, patient found to be hypoxic to 80 on room air, with nrb improved to only 87. Patient is not contributing to H and P. Patient tachycardic and altered. granddaughter who is a physician ( multiple family members are physicians) noted that patient was extremely tachycardic and had worsening neurologic status earlier today and advised Vani to activate ems

## 2020-02-28 NOTE — ED ADULT NURSE NOTE - OBJECTIVE STATEMENT
Patient arrived from home with family c/o AMS that started this morning and dark urine for the last few days. Upon arrival to ED patient found to be Hypoxic with SPO2 81 4LNC, Family states they wish to avoid intubation at this time. MD will discus treatment options. .

## 2020-02-28 NOTE — ED PROVIDER NOTE - PROGRESS NOTE DETAILS
discussed with family hypercapnia, I offerred intubation, daughter Georgia is bedside, declined at this time, wants to trial bipap, understands this is high risk as patient is altered [facility] access hospital organ called, not a candidate for organ donation: reference #[id number]
